# Patient Record
Sex: FEMALE | Race: WHITE | NOT HISPANIC OR LATINO | URBAN - METROPOLITAN AREA
[De-identification: names, ages, dates, MRNs, and addresses within clinical notes are randomized per-mention and may not be internally consistent; named-entity substitution may affect disease eponyms.]

---

## 2017-07-03 ENCOUNTER — OUTPATIENT (OUTPATIENT)
Dept: OUTPATIENT SERVICES | Facility: HOSPITAL | Age: 67
LOS: 1 days | Discharge: HOME | End: 2017-07-03

## 2017-07-03 DIAGNOSIS — Z01.818 ENCOUNTER FOR OTHER PREPROCEDURAL EXAMINATION: ICD-10-CM

## 2017-07-07 ENCOUNTER — OUTPATIENT (OUTPATIENT)
Dept: OUTPATIENT SERVICES | Facility: HOSPITAL | Age: 67
LOS: 1 days | Discharge: HOME | End: 2017-07-07

## 2017-07-14 DIAGNOSIS — M20.42 OTHER HAMMER TOE(S) (ACQUIRED), LEFT FOOT: ICD-10-CM

## 2017-07-14 DIAGNOSIS — M21.611 BUNION OF RIGHT FOOT: ICD-10-CM

## 2017-07-14 DIAGNOSIS — I10 ESSENTIAL (PRIMARY) HYPERTENSION: ICD-10-CM

## 2017-07-14 DIAGNOSIS — M20.11 HALLUX VALGUS (ACQUIRED), RIGHT FOOT: ICD-10-CM

## 2017-07-14 DIAGNOSIS — M20.41 OTHER HAMMER TOE(S) (ACQUIRED), RIGHT FOOT: ICD-10-CM

## 2017-07-14 DIAGNOSIS — E11.9 TYPE 2 DIABETES MELLITUS WITHOUT COMPLICATIONS: ICD-10-CM

## 2017-08-16 ENCOUNTER — OUTPATIENT (OUTPATIENT)
Dept: OUTPATIENT SERVICES | Facility: HOSPITAL | Age: 67
LOS: 1 days | Discharge: HOME | End: 2017-08-16

## 2017-08-16 DIAGNOSIS — Z12.31 ENCOUNTER FOR SCREENING MAMMOGRAM FOR MALIGNANT NEOPLASM OF BREAST: ICD-10-CM

## 2018-02-22 ENCOUNTER — RESULT REVIEW (OUTPATIENT)
Age: 68
End: 2018-02-22

## 2018-02-22 ENCOUNTER — OUTPATIENT (OUTPATIENT)
Dept: OUTPATIENT SERVICES | Facility: HOSPITAL | Age: 68
LOS: 1 days | Discharge: HOME | End: 2018-02-22

## 2018-02-22 VITALS — HEART RATE: 78 BPM | RESPIRATION RATE: 18 BRPM | SYSTOLIC BLOOD PRESSURE: 160 MMHG | DIASTOLIC BLOOD PRESSURE: 100 MMHG

## 2018-02-22 VITALS
DIASTOLIC BLOOD PRESSURE: 85 MMHG | HEIGHT: 61 IN | RESPIRATION RATE: 18 BRPM | HEART RATE: 76 BPM | TEMPERATURE: 98 F | SYSTOLIC BLOOD PRESSURE: 158 MMHG | WEIGHT: 130.07 LBS

## 2018-02-22 DIAGNOSIS — M21.611 BUNION OF RIGHT FOOT: Chronic | ICD-10-CM

## 2018-02-22 NOTE — H&P ADULT - NSHPPHYSICALEXAM_GEN_ALL_CORE
PHYSICAL EXAM:   Vital Signs:  Vital Signs Last 24 Hrs  T(C): 36.7 (22 Feb 2018 08:10), Max: 36.7 (22 Feb 2018 08:10)  T(F): 98.1 (22 Feb 2018 08:10), Max: 98.1 (22 Feb 2018 08:10)  HR: 76 (22 Feb 2018 08:10) (76 - 76)  BP: 158/85 (22 Feb 2018 08:10) (158/85 - 158/85)  BP(mean): --  RR: 18 (22 Feb 2018 08:10) (18 - 18)  SpO2: --  Daily Height in cm: 154.94 (22 Feb 2018 08:10)    Daily     GENERAL:  Appears stated age, well-groomed, well-nourished, no distress  HEENT:  NC/AT,  conjunctivae clear and pink, no thyromegaly, nodules, adenopathy, no JVD, sclera -anicteric  CHEST:  Full & symmetric excursion, no increased effort, breath sounds clear  HEART:  Regular rhythm, S1, S2, no murmur/rub/S3/S4, no abdominal bruit, no edema  ABDOMEN:  Soft, non-tender, non-distended, normoactive bowel sounds,  no masses ,no hepato-splenomegaly, no signs of chronic liver disease  EXTEREMITIES:  no cyanosis,clubbing or edema  SKIN:  No rash/erythema/ecchymoses/petechiae/wounds/abscess/warm/dry  NEURO:  Alert, oriented, no asterixis, no tremor, no encephalopathy

## 2018-02-22 NOTE — H&P ADULT - ASSESSMENT
68 year old female is here for elective outpatient colonoscopy for bright red bleeding per rectum and screening for CRC.

## 2018-02-26 DIAGNOSIS — K64.4 RESIDUAL HEMORRHOIDAL SKIN TAGS: ICD-10-CM

## 2018-02-26 DIAGNOSIS — K64.8 OTHER HEMORRHOIDS: ICD-10-CM

## 2018-02-26 DIAGNOSIS — Z12.11 ENCOUNTER FOR SCREENING FOR MALIGNANT NEOPLASM OF COLON: ICD-10-CM

## 2018-02-26 DIAGNOSIS — K57.30 DIVERTICULOSIS OF LARGE INTESTINE WITHOUT PERFORATION OR ABSCESS WITHOUT BLEEDING: ICD-10-CM

## 2018-02-26 LAB — SURGICAL PATHOLOGY STUDY: SIGNIFICANT CHANGE UP

## 2018-08-17 ENCOUNTER — OUTPATIENT (OUTPATIENT)
Dept: OUTPATIENT SERVICES | Facility: HOSPITAL | Age: 68
LOS: 1 days | Discharge: HOME | End: 2018-08-17

## 2018-08-17 DIAGNOSIS — M21.611 BUNION OF RIGHT FOOT: Chronic | ICD-10-CM

## 2018-08-17 DIAGNOSIS — Z12.31 ENCOUNTER FOR SCREENING MAMMOGRAM FOR MALIGNANT NEOPLASM OF BREAST: ICD-10-CM

## 2018-08-17 PROBLEM — I34.0 NONRHEUMATIC MITRAL (VALVE) INSUFFICIENCY: Chronic | Status: ACTIVE | Noted: 2018-02-22

## 2018-08-17 PROBLEM — I10 ESSENTIAL (PRIMARY) HYPERTENSION: Chronic | Status: ACTIVE | Noted: 2018-02-22

## 2019-08-20 ENCOUNTER — OUTPATIENT (OUTPATIENT)
Dept: OUTPATIENT SERVICES | Facility: HOSPITAL | Age: 69
LOS: 1 days | Discharge: HOME | End: 2019-08-20
Payer: COMMERCIAL

## 2019-08-20 DIAGNOSIS — M21.611 BUNION OF RIGHT FOOT: Chronic | ICD-10-CM

## 2019-08-20 DIAGNOSIS — Z12.31 ENCOUNTER FOR SCREENING MAMMOGRAM FOR MALIGNANT NEOPLASM OF BREAST: ICD-10-CM

## 2019-08-20 PROCEDURE — 77067 SCR MAMMO BI INCL CAD: CPT | Mod: 26

## 2019-08-20 PROCEDURE — 77063 BREAST TOMOSYNTHESIS BI: CPT | Mod: 26

## 2019-10-22 NOTE — ASU PATIENT PROFILE, ADULT - PSH
Bilateral bunions     delivery delivered    History of surgery  tonsillectomy, hysterectomy, b/l eye surgery for muscle, knee miniscus sx right

## 2019-10-23 ENCOUNTER — OUTPATIENT (OUTPATIENT)
Dept: OUTPATIENT SERVICES | Facility: HOSPITAL | Age: 69
LOS: 1 days | Discharge: HOME | End: 2019-10-23

## 2019-10-23 VITALS — SYSTOLIC BLOOD PRESSURE: 145 MMHG | HEART RATE: 67 BPM | DIASTOLIC BLOOD PRESSURE: 70 MMHG | RESPIRATION RATE: 18 BRPM

## 2019-10-23 VITALS
DIASTOLIC BLOOD PRESSURE: 76 MMHG | OXYGEN SATURATION: 99 % | HEART RATE: 69 BPM | SYSTOLIC BLOOD PRESSURE: 158 MMHG | TEMPERATURE: 97 F | RESPIRATION RATE: 18 BRPM | HEIGHT: 61 IN | WEIGHT: 134.92 LBS

## 2019-10-23 DIAGNOSIS — M21.611 BUNION OF RIGHT FOOT: Chronic | ICD-10-CM

## 2019-10-23 DIAGNOSIS — Z98.890 OTHER SPECIFIED POSTPROCEDURAL STATES: Chronic | ICD-10-CM

## 2019-10-23 LAB — GLUCOSE BLDC GLUCOMTR-MCNC: 127 MG/DL — HIGH (ref 70–99)

## 2019-10-23 RX ORDER — RISEDRONATE SODIUM 25.8; 4.2 MG/1; MG/1
0 TABLET, FILM COATED ORAL
Qty: 0 | Refills: 0 | DISCHARGE

## 2019-10-23 NOTE — PRE-ANESTHESIA EVALUATION ADULT - NSANTHOSAYNRD_GEN_A_CORE
No. BRANDI screening performed.  STOP BANG Legend: 0-2 = LOW Risk; 3-4 = INTERMEDIATE Risk; 5-8 = HIGH Risk

## 2019-11-06 DIAGNOSIS — H25.12 AGE-RELATED NUCLEAR CATARACT, LEFT EYE: ICD-10-CM

## 2019-11-06 DIAGNOSIS — I10 ESSENTIAL (PRIMARY) HYPERTENSION: ICD-10-CM

## 2019-11-06 DIAGNOSIS — Z79.84 LONG TERM (CURRENT) USE OF ORAL HYPOGLYCEMIC DRUGS: ICD-10-CM

## 2019-11-06 DIAGNOSIS — E11.9 TYPE 2 DIABETES MELLITUS WITHOUT COMPLICATIONS: ICD-10-CM

## 2019-11-06 DIAGNOSIS — I34.0 NONRHEUMATIC MITRAL (VALVE) INSUFFICIENCY: ICD-10-CM

## 2020-02-20 ENCOUNTER — OUTPATIENT (OUTPATIENT)
Dept: OUTPATIENT SERVICES | Facility: HOSPITAL | Age: 70
LOS: 1 days | Discharge: HOME | End: 2020-02-20

## 2020-02-20 DIAGNOSIS — M21.611 BUNION OF RIGHT FOOT: Chronic | ICD-10-CM

## 2020-02-20 DIAGNOSIS — Z98.890 OTHER SPECIFIED POSTPROCEDURAL STATES: Chronic | ICD-10-CM

## 2020-02-20 PROBLEM — Z84.89 FAMILY HISTORY OF OTHER SPECIFIED CONDITIONS: Chronic | Status: ACTIVE | Noted: 2019-10-23

## 2020-02-25 DIAGNOSIS — Z09 ENCOUNTER FOR FOLLOW-UP EXAMINATION AFTER COMPLETED TREATMENT FOR CONDITIONS OTHER THAN MALIGNANT NEOPLASM: ICD-10-CM

## 2020-02-25 DIAGNOSIS — Z78.0 ASYMPTOMATIC MENOPAUSAL STATE: ICD-10-CM

## 2020-02-25 DIAGNOSIS — M89.9 DISORDER OF BONE, UNSPECIFIED: ICD-10-CM

## 2020-08-31 ENCOUNTER — OUTPATIENT (OUTPATIENT)
Dept: OUTPATIENT SERVICES | Facility: HOSPITAL | Age: 70
LOS: 1 days | Discharge: HOME | End: 2020-08-31
Payer: COMMERCIAL

## 2020-08-31 DIAGNOSIS — Z98.890 OTHER SPECIFIED POSTPROCEDURAL STATES: Chronic | ICD-10-CM

## 2020-08-31 DIAGNOSIS — M21.611 BUNION OF RIGHT FOOT: Chronic | ICD-10-CM

## 2020-08-31 DIAGNOSIS — Z12.31 ENCOUNTER FOR SCREENING MAMMOGRAM FOR MALIGNANT NEOPLASM OF BREAST: ICD-10-CM

## 2020-08-31 PROCEDURE — 77063 BREAST TOMOSYNTHESIS BI: CPT | Mod: 26

## 2020-08-31 PROCEDURE — 77067 SCR MAMMO BI INCL CAD: CPT | Mod: 26

## 2021-09-04 ENCOUNTER — OUTPATIENT (OUTPATIENT)
Dept: OUTPATIENT SERVICES | Facility: HOSPITAL | Age: 71
LOS: 1 days | Discharge: HOME | End: 2021-09-04
Payer: COMMERCIAL

## 2021-09-04 DIAGNOSIS — Z12.31 ENCOUNTER FOR SCREENING MAMMOGRAM FOR MALIGNANT NEOPLASM OF BREAST: ICD-10-CM

## 2021-09-04 DIAGNOSIS — M21.611 BUNION OF RIGHT FOOT: Chronic | ICD-10-CM

## 2021-09-04 DIAGNOSIS — Z98.890 OTHER SPECIFIED POSTPROCEDURAL STATES: Chronic | ICD-10-CM

## 2021-09-04 PROCEDURE — 77063 BREAST TOMOSYNTHESIS BI: CPT | Mod: 26

## 2021-09-04 PROCEDURE — 77067 SCR MAMMO BI INCL CAD: CPT | Mod: 26

## 2022-04-29 ENCOUNTER — INPATIENT (INPATIENT)
Facility: HOSPITAL | Age: 72
LOS: 4 days | Discharge: HOME | End: 2022-05-04
Attending: INTERNAL MEDICINE | Admitting: INTERNAL MEDICINE
Payer: COMMERCIAL

## 2022-04-29 VITALS
TEMPERATURE: 98 F | HEIGHT: 61 IN | OXYGEN SATURATION: 97 % | RESPIRATION RATE: 18 BRPM | SYSTOLIC BLOOD PRESSURE: 198 MMHG | DIASTOLIC BLOOD PRESSURE: 83 MMHG | HEART RATE: 45 BPM

## 2022-04-29 DIAGNOSIS — M21.611 BUNION OF RIGHT FOOT: Chronic | ICD-10-CM

## 2022-04-29 DIAGNOSIS — Z98.890 OTHER SPECIFIED POSTPROCEDURAL STATES: Chronic | ICD-10-CM

## 2022-04-29 LAB
ALBUMIN SERPL ELPH-MCNC: 4.6 G/DL — SIGNIFICANT CHANGE UP (ref 3.5–5.2)
ALP SERPL-CCNC: 53 U/L — SIGNIFICANT CHANGE UP (ref 30–115)
ALT FLD-CCNC: 39 U/L — SIGNIFICANT CHANGE UP (ref 0–41)
ANION GAP SERPL CALC-SCNC: 15 MMOL/L — HIGH (ref 7–14)
AST SERPL-CCNC: 27 U/L — SIGNIFICANT CHANGE UP (ref 0–41)
BASOPHILS # BLD AUTO: 0.07 K/UL — SIGNIFICANT CHANGE UP (ref 0–0.2)
BASOPHILS NFR BLD AUTO: 0.9 % — SIGNIFICANT CHANGE UP (ref 0–1)
BILIRUB SERPL-MCNC: 0.7 MG/DL — SIGNIFICANT CHANGE UP (ref 0.2–1.2)
BLD GP AB SCN SERPL QL: SIGNIFICANT CHANGE UP
BUN SERPL-MCNC: 8 MG/DL — LOW (ref 10–20)
CALCIUM SERPL-MCNC: 9.5 MG/DL — SIGNIFICANT CHANGE UP (ref 8.5–10.1)
CHLORIDE SERPL-SCNC: 108 MMOL/L — SIGNIFICANT CHANGE UP (ref 98–110)
CO2 SERPL-SCNC: 22 MMOL/L — SIGNIFICANT CHANGE UP (ref 17–32)
CREAT SERPL-MCNC: 0.6 MG/DL — LOW (ref 0.7–1.5)
EGFR: 96 ML/MIN/1.73M2 — SIGNIFICANT CHANGE UP
EOSINOPHIL # BLD AUTO: 0.12 K/UL — SIGNIFICANT CHANGE UP (ref 0–0.7)
EOSINOPHIL NFR BLD AUTO: 1.5 % — SIGNIFICANT CHANGE UP (ref 0–8)
GLUCOSE SERPL-MCNC: 148 MG/DL — HIGH (ref 70–99)
HCT VFR BLD CALC: 39.4 % — SIGNIFICANT CHANGE UP (ref 37–47)
HGB BLD-MCNC: 13.4 G/DL — SIGNIFICANT CHANGE UP (ref 12–16)
IMM GRANULOCYTES NFR BLD AUTO: 0.2 % — SIGNIFICANT CHANGE UP (ref 0.1–0.3)
LYMPHOCYTES # BLD AUTO: 1.46 K/UL — SIGNIFICANT CHANGE UP (ref 1.2–3.4)
LYMPHOCYTES # BLD AUTO: 18 % — LOW (ref 20.5–51.1)
MAGNESIUM SERPL-MCNC: 1.6 MG/DL — LOW (ref 1.8–2.4)
MCHC RBC-ENTMCNC: 29.5 PG — SIGNIFICANT CHANGE UP (ref 27–31)
MCHC RBC-ENTMCNC: 34 G/DL — SIGNIFICANT CHANGE UP (ref 32–37)
MCV RBC AUTO: 86.6 FL — SIGNIFICANT CHANGE UP (ref 81–99)
MONOCYTES # BLD AUTO: 0.68 K/UL — HIGH (ref 0.1–0.6)
MONOCYTES NFR BLD AUTO: 8.4 % — SIGNIFICANT CHANGE UP (ref 1.7–9.3)
NEUTROPHILS # BLD AUTO: 5.77 K/UL — SIGNIFICANT CHANGE UP (ref 1.4–6.5)
NEUTROPHILS NFR BLD AUTO: 71 % — SIGNIFICANT CHANGE UP (ref 42.2–75.2)
NRBC # BLD: 0 /100 WBCS — SIGNIFICANT CHANGE UP (ref 0–0)
NT-PROBNP SERPL-SCNC: 1132 PG/ML — HIGH (ref 0–300)
PLATELET # BLD AUTO: 275 K/UL — SIGNIFICANT CHANGE UP (ref 130–400)
POTASSIUM SERPL-MCNC: 3.9 MMOL/L — SIGNIFICANT CHANGE UP (ref 3.5–5)
POTASSIUM SERPL-SCNC: 3.9 MMOL/L — SIGNIFICANT CHANGE UP (ref 3.5–5)
PROT SERPL-MCNC: 7.2 G/DL — SIGNIFICANT CHANGE UP (ref 6–8)
RBC # BLD: 4.55 M/UL — SIGNIFICANT CHANGE UP (ref 4.2–5.4)
RBC # FLD: 14.6 % — HIGH (ref 11.5–14.5)
SARS-COV-2 RNA SPEC QL NAA+PROBE: SIGNIFICANT CHANGE UP
SODIUM SERPL-SCNC: 145 MMOL/L — SIGNIFICANT CHANGE UP (ref 135–146)
TROPONIN T SERPL-MCNC: <0.01 NG/ML — SIGNIFICANT CHANGE UP
WBC # BLD: 8.12 K/UL — SIGNIFICANT CHANGE UP (ref 4.8–10.8)
WBC # FLD AUTO: 8.12 K/UL — SIGNIFICANT CHANGE UP (ref 4.8–10.8)

## 2022-04-29 PROCEDURE — 71045 X-RAY EXAM CHEST 1 VIEW: CPT | Mod: 26

## 2022-04-29 PROCEDURE — 93010 ELECTROCARDIOGRAM REPORT: CPT

## 2022-04-29 PROCEDURE — 99285 EMERGENCY DEPT VISIT HI MDM: CPT

## 2022-04-29 RX ORDER — MAGNESIUM SULFATE 500 MG/ML
2 VIAL (ML) INJECTION ONCE
Refills: 0 | Status: COMPLETED | OUTPATIENT
Start: 2022-04-29 | End: 2022-04-29

## 2022-04-29 RX ORDER — NEBIVOLOL HYDROCHLORIDE 5 MG/1
1 TABLET ORAL
Qty: 0 | Refills: 0 | DISCHARGE

## 2022-04-29 RX ADMIN — Medication 25 GRAM(S): at 22:00

## 2022-04-29 NOTE — H&P ADULT - HISTORY OF PRESENT ILLNESS
Patient is a 72 year old female with PMHx opf LBBB, HTN, Diabetes Mellitus Type 2, and mitral valve prolapse presents to the ED following an episode of bradycardia and chest discomfort. Patient was planned for colonoscopy today when her HS was found to be 35 - 45 bpm. Patient immediately  called her cardiologist Dr. Chino who recommenced patient report to the ED for evaluation. At patients last outpatient cardiology visit in Feb 2022, ekg demonstrated baseline LBBB. Patient denies headache, blurred vision, syncope, dizziness, chest pain, shortness of breath, palpitations, diaphoresis, abdominal pain, or nausea/vomitting/diarrhea.      In the ED, patient initially hypertensive to 198/92 with HR of 45. Hypertension self resolved, now 133/55 with HR of 55. Labs revealing Mg 1.6 and BNP 1132. Troponin negative x1. EKG demonstrating complete heart block. Patient is afebrile and saturating well on room air. Patient currently asymptomatic. She was evaluated by the cardiology fellow, recommending admission to CCU for monitoring.

## 2022-04-29 NOTE — H&P ADULT - ASSESSMENT
IMPRESSION:  Complete Heart Block  Hx of LBBB  Diabetes Mellitus Type 2  HTN    PLAN:    CNS: Avoid CNS Depressants     HEENT: Oral care. Aspiration precautions     PULMONARY: HOB @ 45. Monitor Pulse Ox. Keep > 93%. Supplement as needed.    CARDIOVASCULAR: currently HD stable and asymptomatic, admit to CCU for monitoring, follow up EP recommendations Keep I < O    GI: NPO for now in case of possible procedure     RENAL: Avoid nephrotoxic agents, Cr at baseline     INFECTIOUS DISEASE: no evidence of any underlying infectious process     HEMATOLOGICAL: heparin for DVT PPX     ENDOCRINE: Monitor FSG, Check HbA1c, Lipid panel, TSH    MUSCULOSKELETAL: Ambulate as tolerated     CODE STATUS: Full Code     DISPOSITION: admit to CCU

## 2022-04-29 NOTE — ED PROVIDER NOTE - ATTENDING APP SHARED VISIT CONTRIBUTION OF CARE
Patient is a 72-year-old female comes in with several weeks of feeling tired having chest pressure and shortness of breath on exertion.  Symptoms resolved with rest.  She went for colonoscopy today and was found to be bradycardic down to 30s by anesthesiologist.  Colonoscopy was uneventful and patient came to ED afterwards.  Lying in bed patient has no symptoms other than feeling a little tired.  She had attributed her symptoms to possible anemia after noticing blood in her stool for the last several weeks but has not had any blood testing.    Exam: Pink conjunctivae, cap refill less than 2 seconds, bradycardia, lungs clear to auscultation, no lower extremity edema, no JVD, no calf tenderness, soft nontender abdomen, no acute distress, normal gait  Plan: Labs, EKG, chest x-ray, cardiology consult for permanent pacemaker placement

## 2022-04-29 NOTE — ED PROVIDER NOTE - NS ED ROS FT
Review of Systems  Constitutional:  No fever, chills, malaise, generalized weakness.  Eyes:  No visual changes, eye pain, or discharge.  ENMT:  No hearing changes, pain, or discharge. No nasal congestion, discharge, or bleeding. No throat pain, swelling, or difficulty swallowing.  Cardiac:  No chest pain, palpitations, syncope, or edema.  Respiratory:  + dyspnea, - cough.  GI:  No nausea, vomiting, diarrhea, or abdominal pain. No melena or hematochezia.  :  No dysuria  MS:  No myalgia, muscle weakness, gait abnormality, joint swelling, joint pain, or back pain.  Skin:  No skin rash, pruritis, jaundice, or lesions.  Neuro:  No headache, dizziness, loss of sensation, or focal weakness.

## 2022-04-29 NOTE — ED PROVIDER NOTE - OBJECTIVE STATEMENT
72 F pmhx of LBBB, HTN, DMII, MVP presents to the ED one episode of bradycardia and chest discomfort. pt was at colonscopy today when he HR went down to 45 bpm and then dropped to 35 after. pt called her cardiologist Dr Alexis, who she last saw in feb for routine work up. at time pt had EKG and lab work which were within her norm. pt describes the chest discomfort at a sensation in the center of her chest that comes and goes which is made worse with exertion. denies HA, fever, chills, N,V, blurry vision

## 2022-04-29 NOTE — ED PROVIDER NOTE - PHYSICAL EXAMINATION
VITAL SIGNS: I have reviewed nursing notes and confirm.  CONSTITUTIONAL: Well-developed; well-nourished; in no acute distress.  SKIN: Skin exam is warm and dry, no acute rash.  EYES: EOM intact; conjunctiva and sclera clear.  ENT: No nasal discharge; airway clear.  NECK: Supple; non tender.  CARD: S1, S2 normal; no murmurs, gallops, or rubs.  javier  rate and rhythm.  RESP: No wheezes, rales or rhonchi. Speaking in full sentences.   ABD: Normal bowel sounds; soft; non-distended; non-tender; No rebound or guarding. No CVA tenderness.  EXT: Normal ROM. No clubbing, cyanosis or edema.  NEURO: Alert, oriented. Grossly unremarkable. No focal deficits.

## 2022-04-29 NOTE — ED ADULT NURSE REASSESSMENT NOTE - NS ED NURSE REASSESS COMMENT FT1
Patient met in room at start of shift, VSS. Patient comfortable and resting. Remains asymptomatic. Will continue to monitor.

## 2022-04-29 NOTE — ED PROVIDER NOTE - CLINICAL SUMMARY MEDICAL DECISION MAKING FREE TEXT BOX
complete heart block - symptomatic bradycardia - hemodynamically stable at this time - will schedule PPM - admitted to CCU

## 2022-04-29 NOTE — ED ADULT NURSE NOTE - NSIMPLEMENTINTERV_GEN_ALL_ED
Implemented All Universal Safety Interventions:  Anabel to call system. Call bell, personal items and telephone within reach. Instruct patient to call for assistance. Room bathroom lighting operational. Non-slip footwear when patient is off stretcher. Physically safe environment: no spills, clutter or unnecessary equipment. Stretcher in lowest position, wheels locked, appropriate side rails in place.

## 2022-04-29 NOTE — ED PROVIDER NOTE - NS ED ATTENDING STATEMENT MOD
I have personally provided the amount of critical care time documented below concurrently with the resident/fellow.  This time excludes time spent on separate procedures and time spent teaching. I have reviewed the resident’s / fellow’s documentation and I agree with the history, exam, and assessment and plan of care. Attending with This was a shared visit with the JAIME. I reviewed and verified the documentation and independently performed the documented:

## 2022-04-30 LAB
ALBUMIN SERPL ELPH-MCNC: 4.2 G/DL — SIGNIFICANT CHANGE UP (ref 3.5–5.2)
ALP SERPL-CCNC: 51 U/L — SIGNIFICANT CHANGE UP (ref 30–115)
ALT FLD-CCNC: 33 U/L — SIGNIFICANT CHANGE UP (ref 0–41)
ANION GAP SERPL CALC-SCNC: 14 MMOL/L — SIGNIFICANT CHANGE UP (ref 7–14)
AST SERPL-CCNC: 19 U/L — SIGNIFICANT CHANGE UP (ref 0–41)
BASOPHILS # BLD AUTO: 0.05 K/UL — SIGNIFICANT CHANGE UP (ref 0–0.2)
BASOPHILS NFR BLD AUTO: 0.7 % — SIGNIFICANT CHANGE UP (ref 0–1)
BILIRUB SERPL-MCNC: 0.7 MG/DL — SIGNIFICANT CHANGE UP (ref 0.2–1.2)
BUN SERPL-MCNC: 12 MG/DL — SIGNIFICANT CHANGE UP (ref 10–20)
CALCIUM SERPL-MCNC: 9 MG/DL — SIGNIFICANT CHANGE UP (ref 8.5–10.1)
CHLORIDE SERPL-SCNC: 108 MMOL/L — SIGNIFICANT CHANGE UP (ref 98–110)
CO2 SERPL-SCNC: 23 MMOL/L — SIGNIFICANT CHANGE UP (ref 17–32)
CREAT SERPL-MCNC: 0.6 MG/DL — LOW (ref 0.7–1.5)
EGFR: 96 ML/MIN/1.73M2 — SIGNIFICANT CHANGE UP
EOSINOPHIL # BLD AUTO: 0.15 K/UL — SIGNIFICANT CHANGE UP (ref 0–0.7)
EOSINOPHIL NFR BLD AUTO: 2 % — SIGNIFICANT CHANGE UP (ref 0–8)
GLUCOSE BLDC GLUCOMTR-MCNC: 122 MG/DL — HIGH (ref 70–99)
GLUCOSE BLDC GLUCOMTR-MCNC: 142 MG/DL — HIGH (ref 70–99)
GLUCOSE BLDC GLUCOMTR-MCNC: 151 MG/DL — HIGH (ref 70–99)
GLUCOSE BLDC GLUCOMTR-MCNC: 155 MG/DL — HIGH (ref 70–99)
GLUCOSE BLDC GLUCOMTR-MCNC: 158 MG/DL — HIGH (ref 70–99)
GLUCOSE SERPL-MCNC: 132 MG/DL — HIGH (ref 70–99)
HCT VFR BLD CALC: 37.3 % — SIGNIFICANT CHANGE UP (ref 37–47)
HCV AB S/CO SERPL IA: 0.04 COI — SIGNIFICANT CHANGE UP
HCV AB SERPL-IMP: SIGNIFICANT CHANGE UP
HGB BLD-MCNC: 12.7 G/DL — SIGNIFICANT CHANGE UP (ref 12–16)
IMM GRANULOCYTES NFR BLD AUTO: 0.3 % — SIGNIFICANT CHANGE UP (ref 0.1–0.3)
LYMPHOCYTES # BLD AUTO: 1.14 K/UL — LOW (ref 1.2–3.4)
LYMPHOCYTES # BLD AUTO: 15.1 % — LOW (ref 20.5–51.1)
MAGNESIUM SERPL-MCNC: 2.6 MG/DL — HIGH (ref 1.8–2.4)
MCHC RBC-ENTMCNC: 29.5 PG — SIGNIFICANT CHANGE UP (ref 27–31)
MCHC RBC-ENTMCNC: 34 G/DL — SIGNIFICANT CHANGE UP (ref 32–37)
MCV RBC AUTO: 86.7 FL — SIGNIFICANT CHANGE UP (ref 81–99)
MONOCYTES # BLD AUTO: 0.71 K/UL — HIGH (ref 0.1–0.6)
MONOCYTES NFR BLD AUTO: 9.4 % — HIGH (ref 1.7–9.3)
NEUTROPHILS # BLD AUTO: 5.5 K/UL — SIGNIFICANT CHANGE UP (ref 1.4–6.5)
NEUTROPHILS NFR BLD AUTO: 72.5 % — SIGNIFICANT CHANGE UP (ref 42.2–75.2)
NRBC # BLD: 0 /100 WBCS — SIGNIFICANT CHANGE UP (ref 0–0)
PLATELET # BLD AUTO: 246 K/UL — SIGNIFICANT CHANGE UP (ref 130–400)
POTASSIUM SERPL-MCNC: 3.8 MMOL/L — SIGNIFICANT CHANGE UP (ref 3.5–5)
POTASSIUM SERPL-SCNC: 3.8 MMOL/L — SIGNIFICANT CHANGE UP (ref 3.5–5)
PROT SERPL-MCNC: 6.1 G/DL — SIGNIFICANT CHANGE UP (ref 6–8)
RBC # BLD: 4.3 M/UL — SIGNIFICANT CHANGE UP (ref 4.2–5.4)
RBC # FLD: 14.6 % — HIGH (ref 11.5–14.5)
SODIUM SERPL-SCNC: 145 MMOL/L — SIGNIFICANT CHANGE UP (ref 135–146)
T4 AB SER-ACNC: 7.6 UG/DL — SIGNIFICANT CHANGE UP (ref 4.6–12)
WBC # BLD: 7.57 K/UL — SIGNIFICANT CHANGE UP (ref 4.8–10.8)
WBC # FLD AUTO: 7.57 K/UL — SIGNIFICANT CHANGE UP (ref 4.8–10.8)

## 2022-04-30 PROCEDURE — 93306 TTE W/DOPPLER COMPLETE: CPT | Mod: 26

## 2022-04-30 PROCEDURE — 93010 ELECTROCARDIOGRAM REPORT: CPT

## 2022-04-30 PROCEDURE — 99223 1ST HOSP IP/OBS HIGH 75: CPT | Mod: 25

## 2022-04-30 PROCEDURE — 99291 CRITICAL CARE FIRST HOUR: CPT

## 2022-04-30 RX ORDER — INSULIN LISPRO 100/ML
VIAL (ML) SUBCUTANEOUS
Refills: 0 | Status: DISCONTINUED | OUTPATIENT
Start: 2022-04-30 | End: 2022-05-04

## 2022-04-30 RX ORDER — AMLODIPINE BESYLATE 2.5 MG/1
5 TABLET ORAL DAILY
Refills: 0 | Status: DISCONTINUED | OUTPATIENT
Start: 2022-04-30 | End: 2022-05-04

## 2022-04-30 RX ORDER — CHLORHEXIDINE GLUCONATE 213 G/1000ML
1 SOLUTION TOPICAL
Refills: 0 | Status: DISCONTINUED | OUTPATIENT
Start: 2022-04-30 | End: 2022-05-04

## 2022-04-30 RX ORDER — ATORVASTATIN CALCIUM 80 MG/1
40 TABLET, FILM COATED ORAL AT BEDTIME
Refills: 0 | Status: DISCONTINUED | OUTPATIENT
Start: 2022-04-30 | End: 2022-05-04

## 2022-04-30 RX ORDER — ACETAMINOPHEN 500 MG
650 TABLET ORAL EVERY 6 HOURS
Refills: 0 | Status: DISCONTINUED | OUTPATIENT
Start: 2022-04-30 | End: 2022-05-04

## 2022-04-30 RX ORDER — MAGNESIUM SULFATE 500 MG/ML
2 VIAL (ML) INJECTION ONCE
Refills: 0 | Status: COMPLETED | OUTPATIENT
Start: 2022-04-30 | End: 2022-04-30

## 2022-04-30 RX ORDER — ENOXAPARIN SODIUM 100 MG/ML
40 INJECTION SUBCUTANEOUS EVERY 24 HOURS
Refills: 0 | Status: DISCONTINUED | OUTPATIENT
Start: 2022-04-30 | End: 2022-04-30

## 2022-04-30 RX ORDER — MESALAMINE 400 MG
1200 TABLET, DELAYED RELEASE (ENTERIC COATED) ORAL EVERY 8 HOURS
Refills: 0 | Status: DISCONTINUED | OUTPATIENT
Start: 2022-04-30 | End: 2022-05-04

## 2022-04-30 RX ORDER — INSULIN LISPRO 100/ML
VIAL (ML) SUBCUTANEOUS AT BEDTIME
Refills: 0 | Status: DISCONTINUED | OUTPATIENT
Start: 2022-04-30 | End: 2022-05-04

## 2022-04-30 RX ORDER — POTASSIUM CHLORIDE 20 MEQ
40 PACKET (EA) ORAL ONCE
Refills: 0 | Status: COMPLETED | OUTPATIENT
Start: 2022-04-30 | End: 2022-04-30

## 2022-04-30 RX ADMIN — Medication 20 MILLIGRAM(S): at 06:32

## 2022-04-30 RX ADMIN — Medication 1200 MILLIGRAM(S): at 17:38

## 2022-04-30 RX ADMIN — CHLORHEXIDINE GLUCONATE 1 APPLICATION(S): 213 SOLUTION TOPICAL at 06:32

## 2022-04-30 RX ADMIN — Medication 1: at 17:38

## 2022-04-30 RX ADMIN — Medication 40 MILLIEQUIVALENT(S): at 15:52

## 2022-04-30 RX ADMIN — Medication 650 MILLIGRAM(S): at 21:15

## 2022-04-30 RX ADMIN — AMLODIPINE BESYLATE 5 MILLIGRAM(S): 2.5 TABLET ORAL at 11:12

## 2022-04-30 RX ADMIN — Medication 20 MILLIGRAM(S): at 17:30

## 2022-04-30 RX ADMIN — ATORVASTATIN CALCIUM 40 MILLIGRAM(S): 80 TABLET, FILM COATED ORAL at 21:08

## 2022-04-30 RX ADMIN — Medication 25 GRAM(S): at 09:14

## 2022-04-30 RX ADMIN — Medication 1: at 11:16

## 2022-04-30 RX ADMIN — Medication 650 MILLIGRAM(S): at 22:45

## 2022-04-30 NOTE — CONSULT NOTE ADULT - SUBJECTIVE AND OBJECTIVE BOX
HPI:  Patient is a 72 year old female with PMHx opf LBBB, HTN, Diabetes Mellitus Type 2, and mitral valve prolapse presents to the ED following an episode of bradycardia and chest discomfort. Patient was planned for colonoscopy today when her HS was found to be 35 - 45 bpm. Patient immediately  called her cardiologist Dr. Chino who recommenced patient report to the ED for evaluation. At patients last outpatient cardiology visit in 2022, ekg demonstrated baseline LBBB. Patient denies headache, blurred vision, syncope, dizziness, chest pain, shortness of breath, palpitations, diaphoresis, abdominal pain, or nausea/vomitting/diarrhea.      In the ED, patient initially hypertensive to 198/92 with HR of 45. Hypertension self resolved, now 133/55 with HR of 55. Labs revealing Mg 1.6 and BNP 1132. Troponin negative x1. EKG demonstrating complete heart block. Patient is afebrile and saturating well on room air. Patient currently asymptomatic. She was evaluated by the cardiology fellow, recommending admission to CCU for monitoring.     PAST MEDICAL & SURGICAL HISTORY  Hypertension    Type 1 diabetes mellitus without complication    Mitral insufficiency    Family history of medical problems  cad, lbbb, hyperchoesteremia    Bilateral bunions     delivery delivered    History of surgery  tonsillectomy, hysterectomy, b/l eye surgery for muscle, knee miniscus sx right        FAMILY HISTORY:  FAMILY HISTORY:  Sister pacemaker    SOCIAL HISTORY:  []smoker denies  []Alcohol denies  []Drug denies    ALLERGIES:  No Known Allergies      MEDICATIONS:  MEDICATIONS  (STANDING):  amLODIPine   Tablet 5 milliGRAM(s) Oral daily  atorvastatin 40 milliGRAM(s) Oral at bedtime  chlorhexidine 4% Liquid 1 Application(s) Topical <User Schedule>  enalapril 20 milliGRAM(s) Oral two times a day  enoxaparin Injectable 40 milliGRAM(s) SubCutaneous every 24 hours  insulin lispro (ADMELOG) corrective regimen sliding scale   SubCutaneous three times a day before meals  insulin lispro (ADMELOG) corrective regimen sliding scale   SubCutaneous at bedtime    MEDICATIONS  (PRN):      HOME MEDICATIONS:  Home Medications:  amLODIPine 5 mg oral tablet: 1 tab(s) orally once a day (2022 22:33)  Crestor 10 mg oral tablet: orally once a day (23 Oct 2019 10:11)  enalapril 20 mg oral tablet: orally 2 times a day (23 Oct 2019 10:11)  metFORMIN 500 mg oral tablet, extended release: orally 2 times a day (23 Oct 2019 10:11)      VITALS:   T(F): 97.5 ( @ 04:00), Max: 98.3 ( @ 18:13)  HR: 38 ( @ 06:00) (36 - 55)  BP: 132/63 ( @ 06:00) (111/59 - 198/83)  BP(mean): 90 ( @ 06:00) (81 - 125)  RR: 33 ( @ 06:00) (18 - 34)  SpO2: 95% ( @ 06:00) (92% - 98%)    I&O's Summary      REVIEW OF SYSTEMS:  CONSTITUTIONAL: Weakness  EYES: No visual changes  ENT: No vertigo or throat pain   NECK: No pain or stiffness  RESPIRATORY: No cough, wheezing, hemoptysis; No shortness of breath  CARDIOVASCULAR: No chest pain or palpitations  GASTROINTESTINAL: No abdominal or epigastric pain. No nausea, vomiting, or hematemesis; No diarrhea or constipation. No melena or hematochezia.  GENITOURINARY: No dysuria, frequency or hematuria  NEUROLOGICAL: Dizziness  SKIN: No itching, no rashes  MSK: no    PHYSICAL EXAM:  NEURO: patient is awake , alert and oriented  GEN: Not in acute distress  NECK: no thyroid enlargement, no JVD  LUNGS: Clear to auscultation bilaterally   CARDIOVASCULAR: S1/S2 present, regular rhythm bradycardic , no murmurs or rubs, no carotid bruits,  + PP bilaterally  ABD: Soft, non-tender, non-distended, +BS  EXT: No REN  SKIN: Intact    LABS:                        13.4   8.12  )-----------( 275      ( 2022 18:54 )             39.4         145  |  108  |  8<L>  ----------------------------<  148<H>  3.9   |  22  |  0.6<L>    Ca    9.5      2022 18:54  Mg     1.6         TPro  7.2  /  Alb  4.6  /  TBili  0.7  /  DBili  x   /  AST  27  /  ALT  39  /  AlkPhos  53        Troponin T, Serum: <0.01 ng/mL (22 @ 18:54)    CARDIAC MARKERS ( 2022 18:54 )  x     / <0.01 ng/mL / x     / x     / x            Troponin trend:    Serum Pro-Brain Natriuretic Peptide: 1132 pg/mL (22 @ 18:54)          RADIOLOGY:  -CXR:  -TTE:  -CCTA:  -STRESS TEST:  -CATHETERIZATION:    ECG:  Sinus rhythm with complete heart block and junctional escape rhythm    TELEMETRY EVENTS:   HPI:  Patient is a 72 year old female with PMHx opf LBBB, HTN, Diabetes Mellitus Type 2, and mitral valve prolapse presents to the ED following an episode of bradycardia and chest discomfort. Patient was planned for colonoscopy today when her HS was found to be 35 - 45 bpm. Patient immediately  called her cardiologist Dr. Chino who recommenced patient report to the ED for evaluation. At patients last outpatient cardiology visit in 2022, ekg demonstrated baseline LBBB. Patient denies headache, blurred vision, syncope, dizziness, chest pain, shortness of breath, palpitations, diaphoresis, abdominal pain, or nausea/vomitting/diarrhea.      In the ED, patient initially hypertensive to 198/92 with HR of 45. Hypertension self resolved, now 133/55 with HR of 55. Labs revealing Mg 1.6 and BNP 1132. Troponin negative x1. EKG demonstrating complete heart block. Patient is afebrile and saturating well on room air. Patient currently asymptomatic. She was evaluated by the cardiology fellow, recommending admission to CCU for monitoring.     PAST MEDICAL & SURGICAL HISTORY  Hypertension    Type 1 diabetes mellitus without complication    Mitral insufficiency    Family history of medical problems  cad, lbbb, hyperchoesteremia    Bilateral bunions     delivery delivered    History of surgery  tonsillectomy, hysterectomy, b/l eye surgery for muscle, knee miniscus sx right        FAMILY HISTORY:  FAMILY HISTORY:  Sister pacemaker    SOCIAL HISTORY:  []smoker denies  []Alcohol denies  []Drug denies    ALLERGIES:  No Known Allergies      MEDICATIONS:  MEDICATIONS  (STANDING):  amLODIPine   Tablet 5 milliGRAM(s) Oral daily  atorvastatin 40 milliGRAM(s) Oral at bedtime  chlorhexidine 4% Liquid 1 Application(s) Topical <User Schedule>  enalapril 20 milliGRAM(s) Oral two times a day  enoxaparin Injectable 40 milliGRAM(s) SubCutaneous every 24 hours  insulin lispro (ADMELOG) corrective regimen sliding scale   SubCutaneous three times a day before meals  insulin lispro (ADMELOG) corrective regimen sliding scale   SubCutaneous at bedtime    MEDICATIONS  (PRN):      HOME MEDICATIONS:  Home Medications:  amLODIPine 5 mg oral tablet: 1 tab(s) orally once a day (2022 22:33)  Crestor 10 mg oral tablet: orally once a day (23 Oct 2019 10:11)  enalapril 20 mg oral tablet: orally 2 times a day (23 Oct 2019 10:11)  metFORMIN 500 mg oral tablet, extended release: orally 2 times a day (23 Oct 2019 10:11)      VITALS:   T(F): 97.5 ( @ 04:00), Max: 98.3 ( @ 18:13)  HR: 38 ( @ 06:00) (36 - 55)  BP: 132/63 ( @ 06:00) (111/59 - 198/83)  BP(mean): 90 ( @ 06:00) (81 - 125)  RR: 33 ( @ 06:00) (18 - 34)  SpO2: 95% ( @ 06:00) (92% - 98%)    I&O's Summary      REVIEW OF SYSTEMS:  CONSTITUTIONAL: Weakness  EYES: No visual changes  ENT: No vertigo or throat pain   NECK: No pain or stiffness  RESPIRATORY: No cough, wheezing, hemoptysis; No shortness of breath  CARDIOVASCULAR: No chest pain or palpitations  GASTROINTESTINAL: No abdominal or epigastric pain. No nausea, vomiting, or hematemesis; No diarrhea or constipation. No melena or hematochezia.  GENITOURINARY: No dysuria, frequency or hematuria  NEUROLOGICAL: Dizziness  SKIN: No itching, no rashes  MSK: no    PHYSICAL EXAM:  NEURO: patient is awake , alert and oriented  GEN: Not in acute distress  NECK: no no JVD  LUNGS: Clear to auscultation bilaterally   CARDIOVASCULAR: S1/S2 present, regular rhythm, bradycardic , no murmurs or rubs, no carotid bruits,  + PP bilaterally  ABD: Soft, non-tender, non-distended, +BS  EXT: No REN  SKIN: Intact    LABS:                        13.4   8.12  )-----------( 275      ( 2022 18:54 )             39.4         145  |  108  |  8<L>  ----------------------------<  148<H>  3.9   |  22  |  0.6<L>    Ca    9.5      2022 18:54  Mg     1.6         TPro  7.2  /  Alb  4.6  /  TBili  0.7  /  DBili  x   /  AST  27  /  ALT  39  /  AlkPhos  53        Troponin T, Serum: <0.01 ng/mL (22 @ 18:54)    CARDIAC MARKERS ( 2022 18:54 )  x     / <0.01 ng/mL / x     / x     / x            Troponin trend:    Serum Pro-Brain Natriuretic Peptide: 1132 pg/mL (22 @ 18:54)          RADIOLOGY:  -CXR:  -TTE: at Dr. Chino's office  -CCTA:  -STRESS TEST: several years ago  -CATHETERIZATION: Never    ECG:  Sinus rhythm with complete heart block and junctional escape rhythm    TELEMETRY EVENTS:

## 2022-04-30 NOTE — PROGRESS NOTE ADULT - SUBJECTIVE AND OBJECTIVE BOX
PATIENT:  GALILEA BROOKE  [id number]    CHIEF COMPLAINT:  Patient is a 72y old  Female who presents with a chief complaint of INCOMPLETE NOTE (29 Apr 2022 20:44)      INTERVAL HISTORY/OVERNIGHT EVENTS:      REVIEW OF SYSTEMS:    Constitutional:     [ ] negative [ ] fevers [ ] chills [ ] weight loss [ ] weight gain  HEENT:                  [ ] negative [ ] dry eyes [ ] eye irritation [ ] postnasal drip [ ] nasal congestion  CV:                         [ ] negative  [ ] chest pain [ ] orthopnea [ ] palpitations [ ] murmur  Resp:                     [ ] negative [ ] cough [ ] shortness of breath [ ] dyspnea [ ] wheezing [ ] sputum [ ] hemoptysis  GI:                          [ ] negative [ ] nausea [ ] vomiting [ ] diarrhea [ ] constipation [ ] abd pain [ ] dysphagia   :                        [ ] negative [ ] dysuria [ ] nocturia [ ] hematuria [ ] increased urinary frequency  Musculoskeletal: [ ] negative [ ] back pain [ ] myalgias [ ] arthralgias [ ] fracture  Skin:                       [ ] negative [ ] rash [ ] itch  Neurological:        [ ] negative [ ] headache [ ] dizziness [ ] syncope [ ] weakness [ ] numbness  Psychiatric:           [ ] negative [ ] anxiety [ ] depression  Endocrine:            [ ] negative [ ] diabetes [ ] thyroid problem  Heme/Lymph:      [ ] negative [ ] anemia [ ] bleeding problem  Allergic/Immune: [ ] negative [ ] itchy eyes [ ] nasal discharge [ ] hives [ ] angioedema    [ ] All other systems negative  [ ] Unable to assess ROS because ________.    MEDICATIONS:  MEDICATIONS  (STANDING):  amLODIPine   Tablet 5 milliGRAM(s) Oral daily  atorvastatin 40 milliGRAM(s) Oral at bedtime  chlorhexidine 4% Liquid 1 Application(s) Topical <User Schedule>  enalapril 20 milliGRAM(s) Oral two times a day  enoxaparin Injectable 40 milliGRAM(s) SubCutaneous every 24 hours  insulin lispro (ADMELOG) corrective regimen sliding scale   SubCutaneous three times a day before meals  insulin lispro (ADMELOG) corrective regimen sliding scale   SubCutaneous at bedtime    MEDICATIONS  (PRN):      ALLERGIES:  Allergies    No Known Allergies    Intolerances        OBJECTIVE:  ICU Vital Signs Last 24 Hrs  T(C): 36.4 (30 Apr 2022 04:00), Max: 36.8 (29 Apr 2022 18:13)  T(F): 97.5 (30 Apr 2022 04:00), Max: 98.3 (29 Apr 2022 18:13)  HR: 38 (30 Apr 2022 06:00) (36 - 55)  BP: 132/63 (30 Apr 2022 06:00) (111/59 - 198/83)  BP(mean): 90 (30 Apr 2022 06:00) (81 - 125)  ABP: --  ABP(mean): --  RR: 33 (30 Apr 2022 06:00) (18 - 34)  SpO2: 95% (30 Apr 2022 06:00) (92% - 98%)      Adult Advanced Hemodynamics Last 24 Hrs  CVP(mm Hg): --  CVP(cm H2O): --  CO: --  CI: --  PA: --  PA(mean): --  PCWP: --  SVR: --  SVRI: --  PVR: --  PVRI: --  CAPILLARY BLOOD GLUCOSE      POCT Blood Glucose.: 142 mg/dL (30 Apr 2022 01:46)    CAPILLARY BLOOD GLUCOSE      POCT Blood Glucose.: 142 mg/dL (30 Apr 2022 01:46)    I&O's Summary    Daily Height in cm: 162.56 (30 Apr 2022 01:35)    Daily     PHYSICAL EXAMINATION:  General: WN/WD NAD  HEENT: PERRLA, EOMI, moist mucous membranes  Neurology: A&Ox3, nonfocal, ZAZUETA x 4  Respiratory: CTA B/L, normal respiratory effort, no wheezes, crackles, rales  CV: RRR, S1S2, no murmurs, rubs or gallops  Abdominal: Soft, NT, ND +BS, Last BM  Extremities: No edema, + peripheral pulses  Incisions:   Tubes:    LABS:                          13.4   8.12  )-----------( 275      ( 29 Apr 2022 18:54 )             39.4     04-29    145  |  108  |  8<L>  ----------------------------<  148<H>  3.9   |  22  |  0.6<L>    Ca    9.5      29 Apr 2022 18:54  Mg     1.6     04-29    TPro  7.2  /  Alb  4.6  /  TBili  0.7  /  DBili  x   /  AST  27  /  ALT  39  /  AlkPhos  53  04-29    LIVER FUNCTIONS - ( 29 Apr 2022 18:54 )  Alb: 4.6 g/dL / Pro: 7.2 g/dL / ALK PHOS: 53 U/L / ALT: 39 U/L / AST: 27 U/L / GGT: x             Troponin T, Serum: <0.01 ng/mL (04-29 @ 18:54)    CARDIAC MARKERS ( 29 Apr 2022 18:54 )  x     / <0.01 ng/mL / x     / x     / x              TELEMETRY:     EKG:       IMAGING:  Echo:        LVSF:        EF:        RVSF:        LA:        RA:        Mitral Valve:        Aortic Valve:       Tricuspid Valve:        Pulmonic Valve:        Pericardium:     ASSESSMENT & PLAN:

## 2022-04-30 NOTE — PROGRESS NOTE ADULT - ASSESSMENT
Impression  Complete Heart Block with RBBB and LAD, vitally stable   Diabetes Mellitus Type 2  HTN    PLAN:    CNS:   Avoid CNS Depressants     HEENT:   Oral care. Aspiration precautions     PULMONARY:   HOB @ 45. Monitor Pulse Ox. Keep > 93%. Supplement as needed.    CARDIOVASCULAR:   currently HD stable and asymptomatic  CCU monitoring   cont Norvasc and Enalapril    EP eval for PPM next week    GI:   feeding     RENAL:   Avoid nephrotoxic agents, Cr at baseline     INFECTIOUS DISEASE:   no evidence of any underlying infectious process     HEMATOLOGICAL:   heparin for DVT PPX     ENDOCRINE  : Monitor FSG, Check HbA1c, Lipid panel, TSH    MUSCULOSKELETAL: Ambulate as tolerated     CODE STATUS: Full Code     DISPOSITION:  CCU

## 2022-04-30 NOTE — CONSULT NOTE ADULT - ATTENDING COMMENTS
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 Pt has noticed several weeks of fatigue and dyspnea on exertion.     # CHB without signs of hemodynamic compromise    - Rec monitor on tele  - Check TSH, free T4  - Check 2D echo  - Cardiac MRI to eval for infiltrative heart disease (pt states younger sister has ICD)  - Hold all AVN blocking agents  - Check Lyme titers  - Monitor lytes and keep K>4 and Mg>2  - Will follow  - If no reversible agent found, will likely need PPM

## 2022-04-30 NOTE — PROGRESS NOTE ADULT - ATTENDING COMMENTS
Patient with h/o HTN, DM, LBBB admitted with CHB. Patient c/o chest discomfort and fatigue lately.       Keep off BB   Telemetry monitoring   Get full TTE   EP evaluation for PPM Patient with h/o HTN, DM admitted with CHB. Patient c/o chest discomfort and fatigue lately.       Keep off BB   Telemetry monitoring   Get full TTE   EP evaluation for PPM

## 2022-04-30 NOTE — CONSULT NOTE ADULT - ASSESSMENT
72 year old female with PMHx of LBBB, HTN, Diabetes Mellitus Type 2, and mitral valve prolapse presents to the ED following an episode of bradycardia and chest discomfort.    Symptomatic bradycardia secondary to complete heart block  Hx LBBB, HTN, DMII, Mitral valve prolapse    - EKG shows Sinus rhythm with complete heart block  - Will need Echo, TSH  - Hold all BB and CCB  - No evidence of end organ damage  - Cont to monitor in CCU  - Will likely need Pacemaker next week  - Will discuss with EP attending

## 2022-04-30 NOTE — PATIENT PROFILE ADULT - FALL HARM RISK - HARM RISK INTERVENTIONS
Assistance with ambulation/Assistance OOB with selected safe patient handling equipment/Communicate Risk of Fall with Harm to all staff/Reinforce activity limits and safety measures with patient and family/Review medications for side effects contributing to fall risk/Sit up slowly, dangle for a short time, stand at bedside before walking/Tailored Fall Risk Interventions/Toileting schedule using arm’s reach rule for commode and bathroom/Visual Cue: Yellow wristband and red socks/Bed in lowest position, wheels locked, appropriate side rails in place/Call bell, personal items and telephone in reach/Instruct patient to call for assistance before getting out of bed or chair/Non-slip footwear when patient is out of bed/[place] to call system/Physically safe environment - no spills, clutter or unnecessary equipment/Purposeful Proactive Rounding/Room/bathroom lighting operational, light cord in reach

## 2022-05-01 LAB
ALBUMIN SERPL ELPH-MCNC: 3.9 G/DL — SIGNIFICANT CHANGE UP (ref 3.5–5.2)
ALP SERPL-CCNC: 47 U/L — SIGNIFICANT CHANGE UP (ref 30–115)
ALT FLD-CCNC: 27 U/L — SIGNIFICANT CHANGE UP (ref 0–41)
ANION GAP SERPL CALC-SCNC: 10 MMOL/L — SIGNIFICANT CHANGE UP (ref 7–14)
AST SERPL-CCNC: 15 U/L — SIGNIFICANT CHANGE UP (ref 0–41)
B BURGDOR C6 AB SER-ACNC: NEGATIVE — SIGNIFICANT CHANGE UP
B BURGDOR IGG+IGM SER QL IB: SIGNIFICANT CHANGE UP
B BURGDOR IGG+IGM SER-ACNC: 0.11 INDEX — SIGNIFICANT CHANGE UP (ref 0.01–0.89)
BASOPHILS # BLD AUTO: 0.07 K/UL — SIGNIFICANT CHANGE UP (ref 0–0.2)
BASOPHILS NFR BLD AUTO: 1.3 % — HIGH (ref 0–1)
BILIRUB SERPL-MCNC: 0.5 MG/DL — SIGNIFICANT CHANGE UP (ref 0.2–1.2)
BUN SERPL-MCNC: 14 MG/DL — SIGNIFICANT CHANGE UP (ref 10–20)
CALCIUM SERPL-MCNC: 8.7 MG/DL — SIGNIFICANT CHANGE UP (ref 8.5–10.1)
CHLORIDE SERPL-SCNC: 110 MMOL/L — SIGNIFICANT CHANGE UP (ref 98–110)
CHOLEST SERPL-MCNC: 166 MG/DL — SIGNIFICANT CHANGE UP
CO2 SERPL-SCNC: 23 MMOL/L — SIGNIFICANT CHANGE UP (ref 17–32)
CREAT SERPL-MCNC: 0.5 MG/DL — LOW (ref 0.7–1.5)
EGFR: 100 ML/MIN/1.73M2 — SIGNIFICANT CHANGE UP
EOSINOPHIL # BLD AUTO: 0.27 K/UL — SIGNIFICANT CHANGE UP (ref 0–0.7)
EOSINOPHIL NFR BLD AUTO: 4.9 % — SIGNIFICANT CHANGE UP (ref 0–8)
GLUCOSE BLDC GLUCOMTR-MCNC: 102 MG/DL — HIGH (ref 70–99)
GLUCOSE BLDC GLUCOMTR-MCNC: 147 MG/DL — HIGH (ref 70–99)
GLUCOSE BLDC GLUCOMTR-MCNC: 152 MG/DL — HIGH (ref 70–99)
GLUCOSE BLDC GLUCOMTR-MCNC: 175 MG/DL — HIGH (ref 70–99)
GLUCOSE SERPL-MCNC: 144 MG/DL — HIGH (ref 70–99)
HCT VFR BLD CALC: 36.9 % — LOW (ref 37–47)
HDLC SERPL-MCNC: 46 MG/DL — LOW
HGB BLD-MCNC: 12.2 G/DL — SIGNIFICANT CHANGE UP (ref 12–16)
IMM GRANULOCYTES NFR BLD AUTO: 0.2 % — SIGNIFICANT CHANGE UP (ref 0.1–0.3)
LIPID PNL WITH DIRECT LDL SERPL: 98 MG/DL — SIGNIFICANT CHANGE UP
LYMPHOCYTES # BLD AUTO: 1.5 K/UL — SIGNIFICANT CHANGE UP (ref 1.2–3.4)
LYMPHOCYTES # BLD AUTO: 27.5 % — SIGNIFICANT CHANGE UP (ref 20.5–51.1)
MAGNESIUM SERPL-MCNC: 2 MG/DL — SIGNIFICANT CHANGE UP (ref 1.8–2.4)
MCHC RBC-ENTMCNC: 29 PG — SIGNIFICANT CHANGE UP (ref 27–31)
MCHC RBC-ENTMCNC: 33.1 G/DL — SIGNIFICANT CHANGE UP (ref 32–37)
MCV RBC AUTO: 87.6 FL — SIGNIFICANT CHANGE UP (ref 81–99)
MONOCYTES # BLD AUTO: 0.64 K/UL — HIGH (ref 0.1–0.6)
MONOCYTES NFR BLD AUTO: 11.7 % — HIGH (ref 1.7–9.3)
NEUTROPHILS # BLD AUTO: 2.97 K/UL — SIGNIFICANT CHANGE UP (ref 1.4–6.5)
NEUTROPHILS NFR BLD AUTO: 54.4 % — SIGNIFICANT CHANGE UP (ref 42.2–75.2)
NON HDL CHOLESTEROL: 120 MG/DL — SIGNIFICANT CHANGE UP
NRBC # BLD: 0 /100 WBCS — SIGNIFICANT CHANGE UP (ref 0–0)
PLATELET # BLD AUTO: 236 K/UL — SIGNIFICANT CHANGE UP (ref 130–400)
POTASSIUM SERPL-MCNC: 4.3 MMOL/L — SIGNIFICANT CHANGE UP (ref 3.5–5)
POTASSIUM SERPL-SCNC: 4.3 MMOL/L — SIGNIFICANT CHANGE UP (ref 3.5–5)
PROT SERPL-MCNC: 6.1 G/DL — SIGNIFICANT CHANGE UP (ref 6–8)
RBC # BLD: 4.21 M/UL — SIGNIFICANT CHANGE UP (ref 4.2–5.4)
RBC # FLD: 14.7 % — HIGH (ref 11.5–14.5)
SODIUM SERPL-SCNC: 143 MMOL/L — SIGNIFICANT CHANGE UP (ref 135–146)
TRIGL SERPL-MCNC: 107 MG/DL — SIGNIFICANT CHANGE UP
TROPONIN T SERPL-MCNC: <0.01 NG/ML — SIGNIFICANT CHANGE UP
WBC # BLD: 5.46 K/UL — SIGNIFICANT CHANGE UP (ref 4.8–10.8)
WBC # FLD AUTO: 5.46 K/UL — SIGNIFICANT CHANGE UP (ref 4.8–10.8)

## 2022-05-01 PROCEDURE — 93010 ELECTROCARDIOGRAM REPORT: CPT

## 2022-05-01 PROCEDURE — 99233 SBSQ HOSP IP/OBS HIGH 50: CPT

## 2022-05-01 PROCEDURE — 71045 X-RAY EXAM CHEST 1 VIEW: CPT | Mod: 26

## 2022-05-01 RX ORDER — FUROSEMIDE 40 MG
40 TABLET ORAL ONCE
Refills: 0 | Status: COMPLETED | OUTPATIENT
Start: 2022-05-01 | End: 2022-05-01

## 2022-05-01 RX ADMIN — Medication 40 MILLIGRAM(S): at 13:06

## 2022-05-01 RX ADMIN — Medication 1200 MILLIGRAM(S): at 12:00

## 2022-05-01 RX ADMIN — Medication 1200 MILLIGRAM(S): at 17:13

## 2022-05-01 RX ADMIN — Medication 1: at 15:59

## 2022-05-01 RX ADMIN — CHLORHEXIDINE GLUCONATE 1 APPLICATION(S): 213 SOLUTION TOPICAL at 05:32

## 2022-05-01 RX ADMIN — Medication 20 MILLIGRAM(S): at 17:04

## 2022-05-01 RX ADMIN — AMLODIPINE BESYLATE 5 MILLIGRAM(S): 2.5 TABLET ORAL at 11:59

## 2022-05-01 RX ADMIN — Medication 20 MILLIGRAM(S): at 05:32

## 2022-05-01 RX ADMIN — ATORVASTATIN CALCIUM 40 MILLIGRAM(S): 80 TABLET, FILM COATED ORAL at 21:32

## 2022-05-01 RX ADMIN — Medication 1200 MILLIGRAM(S): at 05:32

## 2022-05-01 RX ADMIN — Medication 1: at 08:04

## 2022-05-01 NOTE — PROGRESS NOTE ADULT - ASSESSMENT
Cardiology: Dr. Chino    72 year old female with PMH of LBBB, HTN, Diabetes Mellitus Type 2, and mitral valve prolapse presents to the ED following an episode of bradycardia and chest discomfort following a colonoscopy. Found to be in CHB 40s.     Lyme neg. EF74%.    Impression:  CHB 40s   Hx LBBB, HTN, DMII, Mitral valve prolapse  Hx severe diverticulosis (colonoscopy 4/29/22)    Plan:  - Pt will require PPM if no reversible cause found  - Hold all BB and CCB (bystolic PTA)  - FU TSH  - Cardiac MRI to r/o infiltrative disease  - OR tentatively Monday vs Tuesday  - Cont tele  - Maintain k>4 and mag>2    EPS [id number]

## 2022-05-01 NOTE — PROGRESS NOTE ADULT - ATTENDING COMMENTS
Patient with CHB with junctional escape rhythm, c/o fatigue prior to admission. Hemodynamically stable, euvolemic.       Continue telemetry monitoring   CMR to rule out infiltrative dz   TTE shows preserved LVEF with PASP 51 mmHg   EP follow up for PPM

## 2022-05-01 NOTE — PROGRESS NOTE ADULT - ASSESSMENT
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 Aspiration precautions     PULMONARY:   HOB @ 45. Monitor Pulse Ox. Keep > 93%. Supplement as needed.    CARDIOVASCULAR:   currently HD stable and asymptomatic  CCU monitoring   cont Norvasc and Enalapril    Avoid AVNRT  EP eval for PPM next week    GI:   feeding     RENAL:   Avoid nephrotoxic agents, Cr at baseline     INFECTIOUS DISEASE:   no evidence of any underlying infectious process     HEMATOLOGICAL:   heparin for DVT PPX     ENDOCRINE  : Monitor FSG, Check HbA1c, Lipid panel, TSH    MUSCULOSKELETAL: Ambulate as tolerated     CODE STATUS: Full Code     DISPOSITION:  CCU

## 2022-05-01 NOTE — PROGRESS NOTE ADULT - SUBJECTIVE AND OBJECTIVE BOX
LENGTH OF HOSPITAL STAY: 2d    CHIEF COMPLAINT:    Patient is a 72y old  Female who presents with a chief complaint of INCOMPLETE NOTE (2022 07:17)    OVERNIGHT EVENTS:    - no events overnight  - Pt examined at bedside, denies CP, SOB, n/v denies urinary sxs; endorses many BMs    FOLLOW UP:    - EP for PPM    HOSPITAL COURSE:    - pt has had uneventful stay in CCU     HPI:    HPI:  Patient is a 72 year old female with PMHx opf LBBB, HTN, Diabetes Mellitus Type 2, and mitral valve prolapse presents to the ED following an episode of bradycardia and chest discomfort. Patient was planned for colonoscopy today when her HS was found to be 35 - 45 bpm. Patient immediately  called her cardiologist Dr. Chino who recommenced patient report to the ED for evaluation. At patients last outpatient cardiology visit in 2022, ekg demonstrated baseline LBBB. Patient denies headache, blurred vision, syncope, dizziness, chest pain, shortness of breath, palpitations, diaphoresis, abdominal pain, or nausea/vomitting/diarrhea.      In the ED, patient initially hypertensive to 198/92 with HR of 45. Hypertension self resolved, now 133/55 with HR of 55. Labs revealing Mg 1.6 and BNP 1132. Troponin negative x1. EKG demonstrating complete heart block. Patient is afebrile and saturating well on room air. Patient currently asymptomatic. She was evaluated by the cardiology fellow, recommending admission to CCU for monitoring.       (2022 20:44)      ALLERGIES:    No Known Allergies    PMHx:    Hypertension; Type 1 diabetes mellitus without complication; Mitral insufficiency; Family history of medical problems; cad, lbbb, hyperchoesteremia; Bilateral bunions;  delivery delivered; History of surgery; tonsillectomy, hysterectomy, b/l eye surgery for muscle, knee miniscus sx right    SOCIAL Hx:    - from home no needs    MEDICATIONS:  STANDING MEDICATIONS  amLODIPine   Tablet 5 milliGRAM(s) Oral daily  atorvastatin 40 milliGRAM(s) Oral at bedtime  chlorhexidine 4% Liquid 1 Application(s) Topical <User Schedule>  enalapril 20 milliGRAM(s) Oral two times a day  insulin lispro (ADMELOG) corrective regimen sliding scale   SubCutaneous three times a day before meals  insulin lispro (ADMELOG) corrective regimen sliding scale   SubCutaneous at bedtime  mesalamine DR Capsule 1200 milliGRAM(s) Oral every 8 hours    PRN MEDICATIONS  acetaminophen     Tablet .. 650 milliGRAM(s) Oral every 6 hours PRN      LABS:                        12.2   5.46  )-----------( 236      ( 01 May 2022 06:40 )             36.9     05    143  |  110  |  14  ----------------------------<  144<H>  4.3   |  23  |  0.5<L>    Ca    8.7      01 May 2022 06:40  Mg     2.0         TPro  6.1  /  Alb  3.9  /  TBili  0.5  /  DBili  x   /  AST  15  /  ALT  27  /  AlkPhos  47            Troponin T, Serum: <0.01 ng/mL (22 @ 06:40)      CARDIAC MARKERS ( 01 May 2022 06:40 )  x     / <0.01 ng/mL / x     / x     / x      CARDIAC MARKERS ( 2022 18:54 )  x     / <0.01 ng/mL / x     / x     / x        VITALS:   T(F): 97.9  HR: 39  BP: 170/75  RR: 22  SpO2: 97%        PHYSICAL EXAM:    Gen: NAD, resting in bed  HEENT: Normocephalic, atraumatic  Neck: supple, no lymphadenopathy  CV: javier & regular rhythm  Lungs: decreased BS at bases, no fremitus  Abdomen: Soft, BS present, non tender  Ext: Warm, well perfused, no edema  Neuro: non focal, awake, moves all extremities equally against resistance  Skin: no rash, no erythema   LENGTH OF HOSPITAL STAY: 2d    CHIEF COMPLAINT:    Patient is a 72y old  Female who presents with a chief complaint of INCOMPLETE NOTE (2022 07:17)    OVERNIGHT EVENTS:    - no events overnight  - Pt examined at bedside, denies CP, SOB, n/v denies urinary sxs; endorses many BMs    FOLLOW UP:    - EP for PPM    HOSPITAL COURSE:    - pt has had uneventful stay in CCU     ALLERGIES:    No Known Allergies    PMHx:    Hypertension; Type 1 diabetes mellitus without complication; Mitral insufficiency; Family history of medical problems; cad, lbbb, hyperchoesteremia; Bilateral bunions;  delivery delivered; History of surgery; tonsillectomy, hysterectomy, b/l eye surgery for muscle, knee miniscus sx right    SOCIAL Hx:    - from home no needs    MEDICATIONS:  STANDING MEDICATIONS  amLODIPine   Tablet 5 milliGRAM(s) Oral daily  atorvastatin 40 milliGRAM(s) Oral at bedtime  chlorhexidine 4% Liquid 1 Application(s) Topical <User Schedule>  enalapril 20 milliGRAM(s) Oral two times a day  insulin lispro (ADMELOG) corrective regimen sliding scale   SubCutaneous three times a day before meals  insulin lispro (ADMELOG) corrective regimen sliding scale   SubCutaneous at bedtime  mesalamine DR Capsule 1200 milliGRAM(s) Oral every 8 hours    PRN MEDICATIONS  acetaminophen     Tablet .. 650 milliGRAM(s) Oral every 6 hours PRN      LABS:                        12.2   5.46  )-----------( 236      ( 01 May 2022 06:40 )             36.9     05    143  |  110  |  14  ----------------------------<  144<H>  4.3   |  23  |  0.5<L>    Ca    8.7      01 May 2022 06:40  Mg     2.0         TPro  6.1  /  Alb  3.9  /  TBili  0.5  /  DBili  x   /  AST  15  /  ALT  27  /  AlkPhos  47  05          Troponin T, Serum: <0.01 ng/mL (22 @ 06:40)      CARDIAC MARKERS ( 01 May 2022 06:40 )  x     / <0.01 ng/mL / x     / x     / x      CARDIAC MARKERS ( 2022 18:54 )  x     / <0.01 ng/mL / x     / x     / x        VITALS:   T(F): 97.9  HR: 39  BP: 170/75  RR: 22  SpO2: 97%        PHYSICAL EXAM:    Gen: NAD, resting in bed  HEENT: Normocephalic, atraumatic  Neck: supple, no lymphadenopathy  CV: javier & regular rhythm  Lungs: decreased BS at bases, no fremitus  Abdomen: Soft, BS present, non tender  Ext: Warm, well perfused, no edema  Neuro: non focal, awake, moves all extremities equally against resistance  Skin: no rash, no erythema

## 2022-05-01 NOTE — PROGRESS NOTE ADULT - SUBJECTIVE AND OBJECTIVE BOX
INTERVAL HPI/OVERNIGHT EVENTS:  No acute events overnight, remains in CHB 40s- asymptomatic.   Patient denies fever, chills, dizziness, syncope, chest pain, palpitations, SOB, cough, abd pain, n/v/d/c, dysuria, hematuria or unusual rash.     MEDICATIONS  (STANDING):  amLODIPine   Tablet 5 milliGRAM(s) Oral daily  atorvastatin 40 milliGRAM(s) Oral at bedtime  chlorhexidine 4% Liquid 1 Application(s) Topical <User Schedule>  enalapril 20 milliGRAM(s) Oral two times a day  furosemide   Injectable 40 milliGRAM(s) IV Push once  insulin lispro (ADMELOG) corrective regimen sliding scale   SubCutaneous three times a day before meals  insulin lispro (ADMELOG) corrective regimen sliding scale   SubCutaneous at bedtime  mesalamine DR Capsule 1200 milliGRAM(s) Oral every 8 hours    MEDICATIONS  (PRN):  acetaminophen     Tablet .. 650 milliGRAM(s) Oral every 6 hours PRN Moderate Pain (4 - 6)      Allergies    No Known Allergies    Intolerances        REVIEW OF SYSTEMS    [x] A ten-point review of systems was otherwise negative except as noted.  [ ] Due to altered mental status/intubation, subjective information were not able to be obtained from the patient. History was obtained, to the extent possible, from review of the chart and collateral sources of information.      Vital Signs Last 24 Hrs  T(C): 36.6 (01 May 2022 12:00), Max: 36.7 (30 Apr 2022 16:00)  T(F): 97.9 (01 May 2022 12:00), Max: 98 (30 Apr 2022 16:00)  HR: 43 (01 May 2022 10:00) (36 - 43)  BP: 144/67 (01 May 2022 12:00) (132/60 - 180/79)  BP(mean): 96 (01 May 2022 12:00) (86 - 119)  RR: 35 (01 May 2022 10:00) (16 - 35)  SpO2: 98% (01 May 2022 12:00) (96% - 98%)    04-30-22 @ 07:01  -  05-01-22 @ 07:00  --------------------------------------------------------  IN: 995 mL / OUT: 5 mL / NET: 990 mL    05-01-22 @ 07:01  -  05-01-22 @ 12:13  --------------------------------------------------------  IN: 240 mL / OUT: 2 mL / NET: 238 mL        Physical Exam  GENERAL: In no apparent distress, well nourished, and hydrated.  HEART: Bradycardia; No murmurs, rubs, or gallops.  PULMONARY: Clear to auscultation and perfusion.  No rales, wheezing, or rhonchi bilaterally.  ABDOMEN: Soft, Nontender, Nondistended; Bowel sounds present  EXTREMITIES:  2+ Peripheral Pulses, No clubbing, cyanosis, or edema  NEUROLOGICAL: AO x4 ,ZAZUETA, speech clear    LABS:                        12.2   5.46  )-----------( 236      ( 01 May 2022 06:40 )             36.9     05-01    143  |  110  |  14  ----------------------------<  144<H>  4.3   |  23  |  0.5<L>    Ca    8.7      01 May 2022 06:40  Mg     2.0     05-01    TPro  6.1  /  Alb  3.9  /  TBili  0.5  /  DBili  x   /  AST  15  /  ALT  27  /  AlkPhos  47  05-01 04-30-22 @ 07:01  -  05-01-22 @ 07:00  --------------------------------------------------------  IN: 995 mL / OUT: 5 mL / NET: 990 mL    05-01-22 @ 07:01  -  05-01-22 @ 12:13  --------------------------------------------------------  IN: 240 mL / OUT: 2 mL / NET: 238 mL    04-30-22 @ 07:01  -  05-01-22 @ 07:00  --------------------------------------------------------  IN: 995 mL / OUT: 5 mL / NET: 990 mL    05-01-22 @ 07:01  -  05-01-22 @ 12:13  --------------------------------------------------------  IN: 240 mL / OUT: 2 mL / NET: 238 mL        RADIOLOGY:  < from: TTE Echo Complete w/o Contrast w/ Doppler (04.30.22 @ 11:46) >  Summary:   1. Normal global left ventricular systolic function.   2. Moderate to severe left atrial enlargement.   3. LV Ejection Fraction by Hu's Method with a biplane EF of 74 %.   4. Mildly increased LV wall thickness.   5. Spectral Doppler shows impaired relaxation pattern of left   ventricular myocardial filling (Grade I diastolic dysfunction).   6. Mildly enlarged right atrium.   7. Mild to moderate mitral valve regurgitation.   8. Mitral annular calcification.   9. Thickening and calcification of the anterior and posterior mitral  valve leaflets.  10. Mild-moderate tricuspid regurgitation.  11. Mild aortic regurgitation.  12. Sclerotic aortic valve with normal opening.  13. Mild pulmonic valve regurgitation.  14. Estimated pulmonary artery systolic pressure is 51.3 mmHg assuming a   right atrial pressure of 15 mmHg, which is consistent with moderate   pulmonary hypertension.    PHYSICIAN INTERPRETATION:  Left Ventricle: The left ventricular internal cavity size is normal. Left   ventricular wall thickness is mildly increased. Global LV systolic   function was normal. Spectral Doppler shows impaired relaxation pattern   of left ventricular myocardial filling (Grade I diastolic dysfunction).  Right Ventricle: Normal right ventricular size and function.  Left Atrium: Moderate to severe left atrial enlargement.  Right Atrium: Mildly enlarged right atrium. RA Area is 19.64 cm² cm2.  Pericardium: There is no evidence of pericardial effusion.  Mitral Valve: Thickening and calcification of the anterior and posterior   mitral valve leaflets. There is mitral annular calcification. No evidence   of mitral stenosis. Mild to moderate mitral valve regurgitation is seen.  Tricuspid Valve: Mild-moderate tricuspid regurgitation is visualized.   Estimated pulmonary artery systolic pressure is 51.3 mmHg assuming a   right atrial pressure of 15 mmHg, which is consistent with moderate   pulmonary hypertension.  Aortic Valve: Sclerotic aortic valve with normal opening. Mild aortic   valve regurgitation is seen.  Pulmonic Valve: Mild pulmonic valve regurgitation.  Aorta: The aortic root is normal in size and structure.    < end of copied text >       INTERVAL HPI/OVERNIGHT EVENTS:  No acute events overnight, remains in CHB 40s- asymptomatic.   Patient denies fever, chills, dizziness, syncope, chest pain, palpitations, SOB, cough, abd pain, n/v/d/c, dysuria, hematuria or unusual rash.     MEDICATIONS  (STANDING):  amLODIPine   Tablet 5 milliGRAM(s) Oral daily  atorvastatin 40 milliGRAM(s) Oral at bedtime  chlorhexidine 4% Liquid 1 Application(s) Topical <User Schedule>  enalapril 20 milliGRAM(s) Oral two times a day  furosemide   Injectable 40 milliGRAM(s) IV Push once  insulin lispro (ADMELOG) corrective regimen sliding scale   SubCutaneous three times a day before meals  insulin lispro (ADMELOG) corrective regimen sliding scale   SubCutaneous at bedtime  mesalamine DR Capsule 1200 milliGRAM(s) Oral every 8 hours    MEDICATIONS  (PRN):  acetaminophen     Tablet .. 650 milliGRAM(s) Oral every 6 hours PRN Moderate Pain (4 - 6)      Allergies    No Known Allergies    Intolerances        REVIEW OF SYSTEMS    [x] A ten-point review of systems was otherwise negative except as noted.  [ ] Due to altered mental status/intubation, subjective information were not able to be obtained from the patient. History was obtained, to the extent possible, from review of the chart and collateral sources of information.      Vital Signs Last 24 Hrs  T(C): 36.6 (01 May 2022 12:00), Max: 36.7 (30 Apr 2022 16:00)  T(F): 97.9 (01 May 2022 12:00), Max: 98 (30 Apr 2022 16:00)  HR: 43 (01 May 2022 10:00) (36 - 43)  BP: 144/67 (01 May 2022 12:00) (132/60 - 180/79)  BP(mean): 96 (01 May 2022 12:00) (86 - 119)  RR: 35 (01 May 2022 10:00) (16 - 35)  SpO2: 98% (01 May 2022 12:00) (96% - 98%)    04-30-22 @ 07:01  -  05-01-22 @ 07:00  --------------------------------------------------------  IN: 995 mL / OUT: 5 mL / NET: 990 mL    05-01-22 @ 07:01  -  05-01-22 @ 12:13  --------------------------------------------------------  IN: 240 mL / OUT: 2 mL / NET: 238 mL        Physical Exam  GENERAL: In no apparent distress, well nourished, and hydrated.  HEART: Bradycardic; No murmurs, rubs, or gallops.  PULMONARY: Clear to auscultation and perfusion.  No rales, wheezing, or rhonchi bilaterally.  ABDOMEN: Soft, Nontender, Nondistended; Bowel sounds present  EXTREMITIES:  2+ Peripheral Pulses, No clubbing, cyanosis, or edema  NEUROLOGICAL: AO x4 ,ZAZUETA, speech clear    LABS:                        12.2   5.46  )-----------( 236      ( 01 May 2022 06:40 )             36.9     05-01    143  |  110  |  14  ----------------------------<  144<H>  4.3   |  23  |  0.5<L>    Ca    8.7      01 May 2022 06:40  Mg     2.0     05-01    TPro  6.1  /  Alb  3.9  /  TBili  0.5  /  DBili  x   /  AST  15  /  ALT  27  /  AlkPhos  47  05-01 04-30-22 @ 07:01 - 05-01-22 @ 07:00  --------------------------------------------------------  IN: 995 mL / OUT: 5 mL / NET: 990 mL    05-01-22 @ 07:01 - 05-01-22 @ 12:13  --------------------------------------------------------  IN: 240 mL / OUT: 2 mL / NET: 238 mL    04-30-22 @ 07:01  -  05-01-22 @ 07:00  --------------------------------------------------------  IN: 995 mL / OUT: 5 mL / NET: 990 mL    05-01-22 @ 07:01  -  05-01-22 @ 12:13  --------------------------------------------------------  IN: 240 mL / OUT: 2 mL / NET: 238 mL        RADIOLOGY:  < from: TTE Echo Complete w/o Contrast w/ Doppler (04.30.22 @ 11:46) >  Summary:   1. Normal global left ventricular systolic function.   2. Moderate to severe left atrial enlargement.   3. LV Ejection Fraction by Hu's Method with a biplane EF of 74 %.   4. Mildly increased LV wall thickness.   5. Spectral Doppler shows impaired relaxation pattern of left   ventricular myocardial filling (Grade I diastolic dysfunction).   6. Mildly enlarged right atrium.   7. Mild to moderate mitral valve regurgitation.   8. Mitral annular calcification.   9. Thickening and calcification of the anterior and posterior mitral  valve leaflets.  10. Mild-moderate tricuspid regurgitation.  11. Mild aortic regurgitation.  12. Sclerotic aortic valve with normal opening.  13. Mild pulmonic valve regurgitation.  14. Estimated pulmonary artery systolic pressure is 51.3 mmHg assuming a   right atrial pressure of 15 mmHg, which is consistent with moderate   pulmonary hypertension.    PHYSICIAN INTERPRETATION:  Left Ventricle: The left ventricular internal cavity size is normal. Left   ventricular wall thickness is mildly increased. Global LV systolic   function was normal. Spectral Doppler shows impaired relaxation pattern   of left ventricular myocardial filling (Grade I diastolic dysfunction).  Right Ventricle: Normal right ventricular size and function.  Left Atrium: Moderate to severe left atrial enlargement.  Right Atrium: Mildly enlarged right atrium. RA Area is 19.64 cm² cm2.  Pericardium: There is no evidence of pericardial effusion.  Mitral Valve: Thickening and calcification of the anterior and posterior   mitral valve leaflets. There is mitral annular calcification. No evidence   of mitral stenosis. Mild to moderate mitral valve regurgitation is seen.  Tricuspid Valve: Mild-moderate tricuspid regurgitation is visualized.   Estimated pulmonary artery systolic pressure is 51.3 mmHg assuming a   right atrial pressure of 15 mmHg, which is consistent with moderate   pulmonary hypertension.  Aortic Valve: Sclerotic aortic valve with normal opening. Mild aortic   valve regurgitation is seen.  Pulmonic Valve: Mild pulmonic valve regurgitation.  Aorta: The aortic root is normal in size and structure.    < end of copied text >

## 2022-05-02 LAB
A1C WITH ESTIMATED AVERAGE GLUCOSE RESULT: 7.4 % — HIGH (ref 4–5.6)
ALBUMIN SERPL ELPH-MCNC: 4.1 G/DL — SIGNIFICANT CHANGE UP (ref 3.5–5.2)
ALP SERPL-CCNC: 51 U/L — SIGNIFICANT CHANGE UP (ref 30–115)
ALT FLD-CCNC: 23 U/L — SIGNIFICANT CHANGE UP (ref 0–41)
ANION GAP SERPL CALC-SCNC: 10 MMOL/L — SIGNIFICANT CHANGE UP (ref 7–14)
ANION GAP SERPL CALC-SCNC: 11 MMOL/L — SIGNIFICANT CHANGE UP (ref 7–14)
AST SERPL-CCNC: 11 U/L — SIGNIFICANT CHANGE UP (ref 0–41)
B BURGDOR C6 AB SER-ACNC: NEGATIVE — SIGNIFICANT CHANGE UP
B BURGDOR IGG+IGM SER-ACNC: 0.05 INDEX — SIGNIFICANT CHANGE UP (ref 0.01–0.89)
BASOPHILS # BLD AUTO: 0.06 K/UL — SIGNIFICANT CHANGE UP (ref 0–0.2)
BASOPHILS NFR BLD AUTO: 1 % — SIGNIFICANT CHANGE UP (ref 0–1)
BILIRUB SERPL-MCNC: 0.6 MG/DL — SIGNIFICANT CHANGE UP (ref 0.2–1.2)
BUN SERPL-MCNC: 17 MG/DL — SIGNIFICANT CHANGE UP (ref 10–20)
BUN SERPL-MCNC: 18 MG/DL — SIGNIFICANT CHANGE UP (ref 10–20)
CALCIUM SERPL-MCNC: 9.3 MG/DL — SIGNIFICANT CHANGE UP (ref 8.5–10.1)
CALCIUM SERPL-MCNC: 9.6 MG/DL — SIGNIFICANT CHANGE UP (ref 8.5–10.1)
CHLORIDE SERPL-SCNC: 105 MMOL/L — SIGNIFICANT CHANGE UP (ref 98–110)
CHLORIDE SERPL-SCNC: 106 MMOL/L — SIGNIFICANT CHANGE UP (ref 98–110)
CO2 SERPL-SCNC: 25 MMOL/L — SIGNIFICANT CHANGE UP (ref 17–32)
CO2 SERPL-SCNC: 27 MMOL/L — SIGNIFICANT CHANGE UP (ref 17–32)
CREAT SERPL-MCNC: 0.6 MG/DL — LOW (ref 0.7–1.5)
CREAT SERPL-MCNC: 0.7 MG/DL — SIGNIFICANT CHANGE UP (ref 0.7–1.5)
EGFR: 92 ML/MIN/1.73M2 — SIGNIFICANT CHANGE UP
EGFR: 96 ML/MIN/1.73M2 — SIGNIFICANT CHANGE UP
EOSINOPHIL # BLD AUTO: 0.33 K/UL — SIGNIFICANT CHANGE UP (ref 0–0.7)
EOSINOPHIL NFR BLD AUTO: 5.2 % — SIGNIFICANT CHANGE UP (ref 0–8)
ESTIMATED AVERAGE GLUCOSE: 166 MG/DL — HIGH (ref 68–114)
GLUCOSE BLDC GLUCOMTR-MCNC: 125 MG/DL — HIGH (ref 70–99)
GLUCOSE BLDC GLUCOMTR-MCNC: 142 MG/DL — HIGH (ref 70–99)
GLUCOSE BLDC GLUCOMTR-MCNC: 193 MG/DL — HIGH (ref 70–99)
GLUCOSE BLDC GLUCOMTR-MCNC: 204 MG/DL — HIGH (ref 70–99)
GLUCOSE SERPL-MCNC: 158 MG/DL — HIGH (ref 70–99)
GLUCOSE SERPL-MCNC: 209 MG/DL — HIGH (ref 70–99)
HCT VFR BLD CALC: 37.7 % — SIGNIFICANT CHANGE UP (ref 37–47)
HGB BLD-MCNC: 12.5 G/DL — SIGNIFICANT CHANGE UP (ref 12–16)
IMM GRANULOCYTES NFR BLD AUTO: 0.3 % — SIGNIFICANT CHANGE UP (ref 0.1–0.3)
INR BLD: 1.07 RATIO — SIGNIFICANT CHANGE UP (ref 0.65–1.3)
LYMPHOCYTES # BLD AUTO: 1.52 K/UL — SIGNIFICANT CHANGE UP (ref 1.2–3.4)
LYMPHOCYTES # BLD AUTO: 24.1 % — SIGNIFICANT CHANGE UP (ref 20.5–51.1)
MAGNESIUM SERPL-MCNC: 1.7 MG/DL — LOW (ref 1.8–2.4)
MCHC RBC-ENTMCNC: 28.7 PG — SIGNIFICANT CHANGE UP (ref 27–31)
MCHC RBC-ENTMCNC: 33.2 G/DL — SIGNIFICANT CHANGE UP (ref 32–37)
MCV RBC AUTO: 86.7 FL — SIGNIFICANT CHANGE UP (ref 81–99)
MONOCYTES # BLD AUTO: 0.76 K/UL — HIGH (ref 0.1–0.6)
MONOCYTES NFR BLD AUTO: 12.1 % — HIGH (ref 1.7–9.3)
NEUTROPHILS # BLD AUTO: 3.61 K/UL — SIGNIFICANT CHANGE UP (ref 1.4–6.5)
NEUTROPHILS NFR BLD AUTO: 57.3 % — SIGNIFICANT CHANGE UP (ref 42.2–75.2)
NRBC # BLD: 0 /100 WBCS — SIGNIFICANT CHANGE UP (ref 0–0)
PLATELET # BLD AUTO: 245 K/UL — SIGNIFICANT CHANGE UP (ref 130–400)
POTASSIUM SERPL-MCNC: 3.6 MMOL/L — SIGNIFICANT CHANGE UP (ref 3.5–5)
POTASSIUM SERPL-MCNC: 4.4 MMOL/L — SIGNIFICANT CHANGE UP (ref 3.5–5)
POTASSIUM SERPL-SCNC: 3.6 MMOL/L — SIGNIFICANT CHANGE UP (ref 3.5–5)
POTASSIUM SERPL-SCNC: 4.4 MMOL/L — SIGNIFICANT CHANGE UP (ref 3.5–5)
PROT SERPL-MCNC: 6.1 G/DL — SIGNIFICANT CHANGE UP (ref 6–8)
PROTHROM AB SERPL-ACNC: 12.3 SEC — SIGNIFICANT CHANGE UP (ref 9.95–12.87)
RAPID RVP RESULT: SIGNIFICANT CHANGE UP
RBC # BLD: 4.35 M/UL — SIGNIFICANT CHANGE UP (ref 4.2–5.4)
RBC # FLD: 14.5 % — SIGNIFICANT CHANGE UP (ref 11.5–14.5)
SARS-COV-2 RNA SPEC QL NAA+PROBE: SIGNIFICANT CHANGE UP
SODIUM SERPL-SCNC: 142 MMOL/L — SIGNIFICANT CHANGE UP (ref 135–146)
SODIUM SERPL-SCNC: 142 MMOL/L — SIGNIFICANT CHANGE UP (ref 135–146)
TSH SERPL-MCNC: 1.06 UIU/ML — SIGNIFICANT CHANGE UP (ref 0.27–4.2)
TSH SERPL-MCNC: 1.07 UIU/ML — SIGNIFICANT CHANGE UP (ref 0.27–4.2)
WBC # BLD: 6.3 K/UL — SIGNIFICANT CHANGE UP (ref 4.8–10.8)
WBC # FLD AUTO: 6.3 K/UL — SIGNIFICANT CHANGE UP (ref 4.8–10.8)

## 2022-05-02 PROCEDURE — 71045 X-RAY EXAM CHEST 1 VIEW: CPT | Mod: 26

## 2022-05-02 PROCEDURE — 99291 CRITICAL CARE FIRST HOUR: CPT | Mod: 57

## 2022-05-02 PROCEDURE — 93010 ELECTROCARDIOGRAM REPORT: CPT

## 2022-05-02 PROCEDURE — 75561 CARDIAC MRI FOR MORPH W/DYE: CPT | Mod: 26

## 2022-05-02 RX ORDER — POTASSIUM CHLORIDE 20 MEQ
20 PACKET (EA) ORAL
Refills: 0 | Status: COMPLETED | OUTPATIENT
Start: 2022-05-02 | End: 2022-05-02

## 2022-05-02 RX ORDER — MAGNESIUM SULFATE 500 MG/ML
2 VIAL (ML) INJECTION ONCE
Refills: 0 | Status: COMPLETED | OUTPATIENT
Start: 2022-05-02 | End: 2022-05-02

## 2022-05-02 RX ORDER — FUROSEMIDE 40 MG
40 TABLET ORAL ONCE
Refills: 0 | Status: COMPLETED | OUTPATIENT
Start: 2022-05-02 | End: 2022-05-02

## 2022-05-02 RX ORDER — ACETAMINOPHEN 500 MG
650 TABLET ORAL ONCE
Refills: 0 | Status: COMPLETED | OUTPATIENT
Start: 2022-05-02 | End: 2022-05-02

## 2022-05-02 RX ORDER — POTASSIUM CHLORIDE 20 MEQ
40 PACKET (EA) ORAL ONCE
Refills: 0 | Status: DISCONTINUED | OUTPATIENT
Start: 2022-05-02 | End: 2022-05-02

## 2022-05-02 RX ADMIN — ATORVASTATIN CALCIUM 40 MILLIGRAM(S): 80 TABLET, FILM COATED ORAL at 21:15

## 2022-05-02 RX ADMIN — Medication 20 MILLIEQUIVALENT(S): at 07:00

## 2022-05-02 RX ADMIN — Medication 2: at 17:28

## 2022-05-02 RX ADMIN — Medication 25 GRAM(S): at 10:56

## 2022-05-02 RX ADMIN — Medication 1200 MILLIGRAM(S): at 21:14

## 2022-05-02 RX ADMIN — Medication 20 MILLIGRAM(S): at 05:38

## 2022-05-02 RX ADMIN — CHLORHEXIDINE GLUCONATE 1 APPLICATION(S): 213 SOLUTION TOPICAL at 05:39

## 2022-05-02 RX ADMIN — Medication 650 MILLIGRAM(S): at 19:42

## 2022-05-02 RX ADMIN — Medication 1200 MILLIGRAM(S): at 15:26

## 2022-05-02 RX ADMIN — Medication 650 MILLIGRAM(S): at 12:48

## 2022-05-02 RX ADMIN — Medication 20 MILLIGRAM(S): at 17:28

## 2022-05-02 RX ADMIN — Medication 20 MILLIEQUIVALENT(S): at 12:25

## 2022-05-02 RX ADMIN — Medication 25 GRAM(S): at 06:47

## 2022-05-02 RX ADMIN — Medication 20 MILLIEQUIVALENT(S): at 09:11

## 2022-05-02 RX ADMIN — Medication 1200 MILLIGRAM(S): at 05:38

## 2022-05-02 RX ADMIN — AMLODIPINE BESYLATE 5 MILLIGRAM(S): 2.5 TABLET ORAL at 12:25

## 2022-05-02 RX ADMIN — Medication 650 MILLIGRAM(S): at 20:12

## 2022-05-02 RX ADMIN — Medication 40 MILLIGRAM(S): at 10:56

## 2022-05-02 NOTE — PROGRESS NOTE ADULT - ATTENDING COMMENTS
Seen / examined and above reviewed.    Baseline LBBB    SR with CHB (stable ventricular escape)    Persistent CHB off beta-blocker.  BP / renal function stable.    MRI today  PPM v. AICD pending results.    Critically ill patient with life threatening condition.

## 2022-05-02 NOTE — PROGRESS NOTE ADULT - ASSESSMENT
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 Aspiration precautions     PULMONARY:   HOB @ 45. Monitor Pulse Ox. Keep > 93%. Supplement as needed.    CARDIOVASCULAR:   currently HD stable and asymptomatic  CCU monitoring   cont Norvasc and Enalapril    Avoid AVNRT  MR heart r/o structural disease - amyloidosis/sarcoidosis etc..  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 Aspiration precautions     PULMONARY:   HOB @ 45. Monitor Pulse Ox. Keep > 93%. Supplement as needed.    CARDIOVASCULAR:   currently HD stable and asymptomatic  CCU monitoring   cont Norvasc and Enalapril    Avoid AVNRT  MR heart r/o structural disease - amyloidosis/sarcoidosis etc..  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 Aspiration precautions     PULMONARY:   HOB @ 45. Monitor Pulse Ox. Keep > 93%. Supplement as needed.    CARDIOVASCULAR:   currently HD stable and asymptomatic  CCU monitoring   cont Norvasc and Enalapril    Avoid AVNRT  MR heart r/o infiltrative disease  For PPM w/ EP after    GI:   feeding     RENAL:   Avoid nephrotoxic agents, Cr at baseline     INFECTIOUS DISEASE:   no evidence of any underlying infectious process     HEMATOLOGICAL:  Pt refusing heparin for DVT PPX 2/2 recent GI procedure; SCD    ENDOCRINE  : Monitor FSG,   HbA1c 7.4  LDL 98  TSH 1.07    MUSCULOSKELETAL: Ambulate as tolerated     CODE STATUS: Full Code     DISPOSITION:  CCU

## 2022-05-02 NOTE — PROGRESS NOTE ADULT - SUBJECTIVE AND OBJECTIVE BOX
LENGTH OF HOSPITAL STAY: 3d    CHIEF COMPLAINT:    Patient is a 72y old  Female who presents with a chief complaint of CHB (01 May 2022 12:13)    OVERNIGHT EVENTS:    - pt still asymptomatic javier overnight  - pt examined at bedside, no new complaints, denies CP, SOB, n/v/d;  - spoke with family on speaker phone, questions answered, updated on plan    FOLLOW UP:    - cardiac MR, then PPM v AICD    HPI:    Patient is a 72 year old female with PMHx opf LBBB, HTN, Diabetes Mellitus Type 2, and mitral valve prolapse presents to the ED following an episode of bradycardia and chest discomfort. Patient was planned for colonoscopy today when her HS was found to be 35 - 45 bpm. Patient immediately  called her cardiologist Dr. Chino who recommenced patient report to the ED for evaluation. At patients last outpatient cardiology visit in 2022, ekg demonstrated baseline LBBB. Patient denies headache, blurred vision, syncope, dizziness, chest pain, shortness of breath, palpitations, diaphoresis, abdominal pain, or nausea/vomitting/diarrhea.      In the ED, patient initially hypertensive to 198/92 with HR of 45. Hypertension self resolved, now 133/55 with HR of 55. Labs revealing Mg 1.6 and BNP 1132. Troponin negative x1. EKG demonstrating complete heart block. Patient is afebrile and saturating well on room air. Patient currently asymptomatic. She was evaluated by the cardiology fellow, recommending admission to CCU for monitoring.       ALLERGIES:    No Known Allergies      PMHx:    Hypertension; Type 1 diabetes mellitus without complication; Mitral insufficiency; Family history of medical problems; cad, lbbb, hyperchoesteremia; Bilateral bunions;  delivery delivered; History of surgery tonsillectomy, hysterectomy, b/l eye surgery for muscle, knee miniscus sx right    SOCIAL Hx:    Family at bedside, multiple family members w/ PPM at young age    MEDICATIONS:  STANDING MEDICATIONS  amLODIPine   Tablet 5 milliGRAM(s) Oral daily  atorvastatin 40 milliGRAM(s) Oral at bedtime  chlorhexidine 4% Liquid 1 Application(s) Topical <User Schedule>  enalapril 20 milliGRAM(s) Oral two times a day  insulin lispro (ADMELOG) corrective regimen sliding scale   SubCutaneous three times a day before meals  insulin lispro (ADMELOG) corrective regimen sliding scale   SubCutaneous at bedtime  mesalamine DR Capsule 1200 milliGRAM(s) Oral every 8 hours    PRN MEDICATIONS  acetaminophen     Tablet .. 650 milliGRAM(s) Oral every 6 hours PRN      LABS:                        12.5   6.30  )-----------( 245      ( 02 May 2022 04:30 )             37.7     05-02    142  |  106  |  18  ----------------------------<  158<H>  3.6   |  25  |  0.6<L>    Ca    9.3      02 May 2022 04:30  Mg     1.7         TPro  6.1  /  Alb  4.1  /  TBili  0.6  /  DBili  x   /  AST  11  /  ALT  23  /  AlkPhos  51      PT/INR - ( 02 May 2022 11:00 )   PT: 12.30 sec;   INR: 1.07 ratio           CARDIAC MARKERS ( 01 May 2022 06:40 )  x     / <0.01 ng/mL / x     / x     / x            VITALS:   T(F): 97.2  HR: 42  BP: 153/69  RR: 20  SpO2: 94%        PHYSICAL EXAM:    Gen: NAD, resting in bed  HEENT: Normocephalic, atraumatic  Neck: supple, no lymphadenopathy  CV: javier rate & regular rhythm  Lungs: decreased BS at bases, no fremitus  Abdomen: Soft, BS present, nontender  Ext: Warm, well perfused, no edema  Neuro: non focal, awake, moves all extremities against resistance  Skin: no rash, no erythema

## 2022-05-02 NOTE — CHART NOTE - NSCHARTNOTEFT_GEN_A_CORE
Cardiology: Dr. Chino    72 year old female with PMH of LBBB, HTN, Diabetes Mellitus Type 2, and mitral valve prolapse presents to the ED following an episode of bradycardia and chest discomfort following a colonoscopy. Found to be in CHB 40s.     Lyme neg. EF74%. TSH 1.06.     Impression:  CHB 40s   Hx LBBB, HTN, DMII, Mitral valve prolapse  Hx severe diverticulosis (colonoscopy 4/29/22)    Plan:  - Pt will require PPM if no reversible cause found  - Hold all BB and CCB (bystolic PTA)  - Cardiac MRI completed, fu results  - OR tentatively tomorrow  - NPO after midnight  - Resend COVID in prep for tomorrow  - Resume diet today

## 2022-05-03 LAB
ALBUMIN SERPL ELPH-MCNC: 4.3 G/DL — SIGNIFICANT CHANGE UP (ref 3.5–5.2)
ALP SERPL-CCNC: 59 U/L — SIGNIFICANT CHANGE UP (ref 30–115)
ALT FLD-CCNC: 19 U/L — SIGNIFICANT CHANGE UP (ref 0–41)
ANION GAP SERPL CALC-SCNC: 12 MMOL/L — SIGNIFICANT CHANGE UP (ref 7–14)
AST SERPL-CCNC: 9 U/L — SIGNIFICANT CHANGE UP (ref 0–41)
BASOPHILS # BLD AUTO: 0.05 K/UL — SIGNIFICANT CHANGE UP (ref 0–0.2)
BASOPHILS NFR BLD AUTO: 0.7 % — SIGNIFICANT CHANGE UP (ref 0–1)
BILIRUB SERPL-MCNC: 1.3 MG/DL — HIGH (ref 0.2–1.2)
BUN SERPL-MCNC: 18 MG/DL — SIGNIFICANT CHANGE UP (ref 10–20)
CALCIUM SERPL-MCNC: 9.6 MG/DL — SIGNIFICANT CHANGE UP (ref 8.5–10.1)
CHLORIDE SERPL-SCNC: 104 MMOL/L — SIGNIFICANT CHANGE UP (ref 98–110)
CO2 SERPL-SCNC: 25 MMOL/L — SIGNIFICANT CHANGE UP (ref 17–32)
CREAT SERPL-MCNC: 0.7 MG/DL — SIGNIFICANT CHANGE UP (ref 0.7–1.5)
EGFR: 92 ML/MIN/1.73M2 — SIGNIFICANT CHANGE UP
EOSINOPHIL # BLD AUTO: 0.14 K/UL — SIGNIFICANT CHANGE UP (ref 0–0.7)
EOSINOPHIL NFR BLD AUTO: 1.8 % — SIGNIFICANT CHANGE UP (ref 0–8)
GLUCOSE BLDC GLUCOMTR-MCNC: 111 MG/DL — HIGH (ref 70–99)
GLUCOSE BLDC GLUCOMTR-MCNC: 119 MG/DL — HIGH (ref 70–99)
GLUCOSE BLDC GLUCOMTR-MCNC: 166 MG/DL — HIGH (ref 70–99)
GLUCOSE BLDC GLUCOMTR-MCNC: 193 MG/DL — HIGH (ref 70–99)
GLUCOSE BLDC GLUCOMTR-MCNC: 98 MG/DL — SIGNIFICANT CHANGE UP (ref 70–99)
GLUCOSE SERPL-MCNC: 179 MG/DL — HIGH (ref 70–99)
HCT VFR BLD CALC: 39.6 % — SIGNIFICANT CHANGE UP (ref 37–47)
HGB BLD-MCNC: 13.3 G/DL — SIGNIFICANT CHANGE UP (ref 12–16)
IMM GRANULOCYTES NFR BLD AUTO: 0.3 % — SIGNIFICANT CHANGE UP (ref 0.1–0.3)
LYMPHOCYTES # BLD AUTO: 1.57 K/UL — SIGNIFICANT CHANGE UP (ref 1.2–3.4)
LYMPHOCYTES # BLD AUTO: 20.4 % — LOW (ref 20.5–51.1)
MAGNESIUM SERPL-MCNC: 2 MG/DL — SIGNIFICANT CHANGE UP (ref 1.8–2.4)
MCHC RBC-ENTMCNC: 29.1 PG — SIGNIFICANT CHANGE UP (ref 27–31)
MCHC RBC-ENTMCNC: 33.6 G/DL — SIGNIFICANT CHANGE UP (ref 32–37)
MCV RBC AUTO: 86.7 FL — SIGNIFICANT CHANGE UP (ref 81–99)
MONOCYTES # BLD AUTO: 0.99 K/UL — HIGH (ref 0.1–0.6)
MONOCYTES NFR BLD AUTO: 12.9 % — HIGH (ref 1.7–9.3)
NEUTROPHILS # BLD AUTO: 4.91 K/UL — SIGNIFICANT CHANGE UP (ref 1.4–6.5)
NEUTROPHILS NFR BLD AUTO: 63.9 % — SIGNIFICANT CHANGE UP (ref 42.2–75.2)
NRBC # BLD: 0 /100 WBCS — SIGNIFICANT CHANGE UP (ref 0–0)
PLATELET # BLD AUTO: 245 K/UL — SIGNIFICANT CHANGE UP (ref 130–400)
POTASSIUM SERPL-MCNC: 4.5 MMOL/L — SIGNIFICANT CHANGE UP (ref 3.5–5)
POTASSIUM SERPL-SCNC: 4.5 MMOL/L — SIGNIFICANT CHANGE UP (ref 3.5–5)
PROT SERPL-MCNC: 6.6 G/DL — SIGNIFICANT CHANGE UP (ref 6–8)
RBC # BLD: 4.57 M/UL — SIGNIFICANT CHANGE UP (ref 4.2–5.4)
RBC # FLD: 14.6 % — HIGH (ref 11.5–14.5)
SODIUM SERPL-SCNC: 141 MMOL/L — SIGNIFICANT CHANGE UP (ref 135–146)
WBC # BLD: 7.68 K/UL — SIGNIFICANT CHANGE UP (ref 4.8–10.8)
WBC # FLD AUTO: 7.68 K/UL — SIGNIFICANT CHANGE UP (ref 4.8–10.8)

## 2022-05-03 PROCEDURE — 33249 INSJ/RPLCMT DEFIB W/LEAD(S): CPT

## 2022-05-03 PROCEDURE — 71045 X-RAY EXAM CHEST 1 VIEW: CPT | Mod: 26

## 2022-05-03 PROCEDURE — 93010 ELECTROCARDIOGRAM REPORT: CPT

## 2022-05-03 PROCEDURE — 99233 SBSQ HOSP IP/OBS HIGH 50: CPT

## 2022-05-03 RX ORDER — ONDANSETRON 8 MG/1
4 TABLET, FILM COATED ORAL ONCE
Refills: 0 | Status: DISCONTINUED | OUTPATIENT
Start: 2022-05-03 | End: 2022-05-04

## 2022-05-03 RX ORDER — VANCOMYCIN HCL 1 G
1000 VIAL (EA) INTRAVENOUS ONCE
Refills: 0 | Status: COMPLETED | OUTPATIENT
Start: 2022-05-03 | End: 2022-05-03

## 2022-05-03 RX ORDER — MAGNESIUM SULFATE 500 MG/ML
2 VIAL (ML) INJECTION ONCE
Refills: 0 | Status: COMPLETED | OUTPATIENT
Start: 2022-05-03 | End: 2022-05-03

## 2022-05-03 RX ORDER — VANCOMYCIN HCL 1 G
750 VIAL (EA) INTRAVENOUS EVERY 12 HOURS
Refills: 0 | Status: COMPLETED | OUTPATIENT
Start: 2022-05-03 | End: 2022-05-04

## 2022-05-03 RX ORDER — SODIUM CHLORIDE 9 MG/ML
1000 INJECTION, SOLUTION INTRAVENOUS
Refills: 0 | Status: DISCONTINUED | OUTPATIENT
Start: 2022-05-03 | End: 2022-05-04

## 2022-05-03 RX ORDER — MORPHINE SULFATE 50 MG/1
2 CAPSULE, EXTENDED RELEASE ORAL
Refills: 0 | Status: DISCONTINUED | OUTPATIENT
Start: 2022-05-03 | End: 2022-05-04

## 2022-05-03 RX ORDER — ACETAMINOPHEN 500 MG
650 TABLET ORAL EVERY 6 HOURS
Refills: 0 | Status: DISCONTINUED | OUTPATIENT
Start: 2022-05-03 | End: 2022-05-03

## 2022-05-03 RX ORDER — OXYCODONE AND ACETAMINOPHEN 5; 325 MG/1; MG/1
1 TABLET ORAL ONCE
Refills: 0 | Status: DISCONTINUED | OUTPATIENT
Start: 2022-05-03 | End: 2022-05-03

## 2022-05-03 RX ADMIN — CHLORHEXIDINE GLUCONATE 1 APPLICATION(S): 213 SOLUTION TOPICAL at 05:19

## 2022-05-03 RX ADMIN — Medication 1200 MILLIGRAM(S): at 05:19

## 2022-05-03 RX ADMIN — Medication 1200 MILLIGRAM(S): at 21:38

## 2022-05-03 RX ADMIN — Medication 20 MILLIGRAM(S): at 17:38

## 2022-05-03 RX ADMIN — OXYCODONE AND ACETAMINOPHEN 1 TABLET(S): 5; 325 TABLET ORAL at 18:03

## 2022-05-03 RX ADMIN — Medication 25 GRAM(S): at 06:26

## 2022-05-03 RX ADMIN — AMLODIPINE BESYLATE 5 MILLIGRAM(S): 2.5 TABLET ORAL at 11:41

## 2022-05-03 RX ADMIN — ATORVASTATIN CALCIUM 40 MILLIGRAM(S): 80 TABLET, FILM COATED ORAL at 21:38

## 2022-05-03 RX ADMIN — OXYCODONE AND ACETAMINOPHEN 1 TABLET(S): 5; 325 TABLET ORAL at 17:39

## 2022-05-03 RX ADMIN — Medication 250 MILLIGRAM(S): at 17:41

## 2022-05-03 RX ADMIN — Medication 250 MILLIGRAM(S): at 23:13

## 2022-05-03 RX ADMIN — Medication 20 MILLIGRAM(S): at 05:19

## 2022-05-03 NOTE — PROGRESS NOTE ADULT - ASSESSMENT
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 Aspiration precautions     PULMONARY:   HOB @ 45. Monitor Pulse Ox. Keep > 93%. Supplement as needed.    CARDIOVASCULAR:   currently HD stable and asymptomatic  CCU monitoring   cont Norvasc and Enalapril    Avoid AVNRT  MR heart r/o infiltrative disease  For PPM w/ EP after    GI:   feeding     RENAL:   Avoid nephrotoxic agents, Cr at baseline     INFECTIOUS DISEASE:   no evidence of any underlying infectious process     HEMATOLOGICAL:  Pt refusing heparin for DVT PPX 2/2 recent GI procedure; SCD    ENDOCRINE  : Monitor FSG,   HbA1c 7.4  LDL 98  TSH 1.07    MUSCULOSKELETAL: Ambulate as tolerated     CODE STATUS: Full Code     DISPOSITION:  CCU    Sinus rhythm with Complete Heart Block - hemodynamically stable   Diabetes Mellitus Type 2  HTN  3.0cm mass-like liver structure    PLAN:    CNS:   Avoid CNS Depressants     HEENT:   Oral care. Aspiration precautions     PULMONARY:   HOB @ 45. Monitor Pulse Ox. Keep > 93%. Supplement as needed.    CARDIOVASCULAR:   currently HD stable and asymptomatic  CCU monitoring   cont Norvasc and Enalapril    Avoid AVNRT  MR heart shows delayed gadolinium enhancement suggestive of  infiltrative disease  Scheduled for AICD today    GI:   feeding   MR shows incidental 3.0cm liver mass    RENAL:   Avoid nephrotoxic agents, Cr at baseline     INFECTIOUS DISEASE:   no evidence of any underlying infectious process     HEMATOLOGICAL:  Pt refusing heparin for DVT PPX 2/2 recent GI procedure; SCD    ENDOCRINE  : Monitor FSG,   HbA1c 7.4  LDL 98  TSH 1.07    MUSCULOSKELETAL: Ambulate as tolerated     CODE STATUS: Full Code     DISPOSITION:  CCU

## 2022-05-03 NOTE — PROGRESS NOTE ADULT - SUBJECTIVE AND OBJECTIVE BOX
PATIENT:  GALILEA BROOKE  [id number]    CHIEF COMPLAINT:  Patient is a 72y old  Female who presents with a chief complaint of INCOMPLETE NOTE (02 May 2022 12:54)      INTERVAL HISTORY/OVERNIGHT EVENTS:      REVIEW OF SYSTEMS:    Constitutional:     [ ] negative [ ] fevers [ ] chills [ ] weight loss [ ] weight gain  HEENT:                  [ ] negative [ ] dry eyes [ ] eye irritation [ ] postnasal drip [ ] nasal congestion  CV:                         [ ] negative  [ ] chest pain [ ] orthopnea [ ] palpitations [ ] murmur  Resp:                     [ ] negative [ ] cough [ ] shortness of breath [ ] dyspnea [ ] wheezing [ ] sputum [ ] hemoptysis  GI:                          [ ] negative [ ] nausea [ ] vomiting [ ] diarrhea [ ] constipation [ ] abd pain [ ] dysphagia   :                        [ ] negative [ ] dysuria [ ] nocturia [ ] hematuria [ ] increased urinary frequency  Musculoskeletal: [ ] negative [ ] back pain [ ] myalgias [ ] arthralgias [ ] fracture  Skin:                       [ ] negative [ ] rash [ ] itch  Neurological:        [ ] negative [ ] headache [ ] dizziness [ ] syncope [ ] weakness [ ] numbness  Psychiatric:           [ ] negative [ ] anxiety [ ] depression  Endocrine:            [ ] negative [ ] diabetes [ ] thyroid problem  Heme/Lymph:      [ ] negative [ ] anemia [ ] bleeding problem  Allergic/Immune: [ ] negative [ ] itchy eyes [ ] nasal discharge [ ] hives [ ] angioedema    [x] All other systems negative  [ ] Unable to assess ROS because ________.    MEDICATIONS:  MEDICATIONS  (STANDING):  amLODIPine   Tablet 5 milliGRAM(s) Oral daily  atorvastatin 40 milliGRAM(s) Oral at bedtime  chlorhexidine 4% Liquid 1 Application(s) Topical <User Schedule>  enalapril 20 milliGRAM(s) Oral two times a day  insulin lispro (ADMELOG) corrective regimen sliding scale   SubCutaneous three times a day before meals  insulin lispro (ADMELOG) corrective regimen sliding scale   SubCutaneous at bedtime  mesalamine DR Capsule 1200 milliGRAM(s) Oral every 8 hours    MEDICATIONS  (PRN):  acetaminophen     Tablet .. 650 milliGRAM(s) Oral every 6 hours PRN Moderate Pain (4 - 6)      ALLERGIES:  Allergies    No Known Allergies    Intolerances        OBJECTIVE:  ICU Vital Signs Last 24 Hrs  T(C): 36.1 (03 May 2022 04:00), Max: 36.4 (02 May 2022 08:00)  T(F): 96.9 (03 May 2022 04:00), Max: 97.5 (02 May 2022 08:00)  HR: 41 (03 May 2022 06:00) (37 - 47)  BP: 122/56 (03 May 2022 06:00) (118/55 - 166/88)  BP(mean): 81 (03 May 2022 06:00) (79 - 118)  ABP: --  ABP(mean): --  RR: 22 (03 May 2022 06:00) (17 - 46)  SpO2: 98% (03 May 2022 06:00) (94% - 99%)      Adult Advanced Hemodynamics Last 24 Hrs  CVP(mm Hg): --  CVP(cm H2O): --  CO: --  CI: --  PA: --  PA(mean): --  PCWP: --  SVR: --  SVRI: --  PVR: --  PVRI: --  CAPILLARY BLOOD GLUCOSE      POCT Blood Glucose.: 193 mg/dL (02 May 2022 21:07)  POCT Blood Glucose.: 204 mg/dL (02 May 2022 17:23)  POCT Blood Glucose.: 125 mg/dL (02 May 2022 12:29)  POCT Blood Glucose.: 142 mg/dL (02 May 2022 09:09)    CAPILLARY BLOOD GLUCOSE      POCT Blood Glucose.: 193 mg/dL (02 May 2022 21:07)    I&O's Summary    01 May 2022 07:01  -  02 May 2022 07:00  --------------------------------------------------------  IN: 1320 mL / OUT: 10 mL / NET: 1310 mL    02 May 2022 07:01  -  03 May 2022 06:58  --------------------------------------------------------  IN: 660 mL / OUT: 1000 mL / NET: -340 mL      Daily     Daily Weight in k (03 May 2022 04:00)    PHYSICAL EXAMINATION:  General: WN/WD NAD  HEENT: PERRLA, EOMI, moist mucous membranes  Neurology: A&Ox3, nonfocal, ZAZUETA x 4  Respiratory: CTA B/L, normal respiratory effort, no wheezes, crackles, rales  CV: RRR, S1S2, no murmurs, rubs or gallops  Abdominal: Soft, NT, ND +BS, Last BM  Extremities: No edema, + peripheral pulses  Incisions:   Tubes:    LABS:                          13.3   7.68  )-----------( 245      ( 03 May 2022 05:00 )             39.6     05-03    141  |  104  |  18  ----------------------------<  179<H>  4.5   |  25  |  0.7    Ca    9.6      03 May 2022 05:00  Mg     2.0     05-03    TPro  6.6  /  Alb  4.3  /  TBili  1.3<H>  /  DBili  x   /  AST  9   /  ALT  19  /  AlkPhos  59  05-03    LIVER FUNCTIONS - ( 03 May 2022 05:00 )  Alb: 4.3 g/dL / Pro: 6.6 g/dL / ALK PHOS: 59 U/L / ALT: 19 U/L / AST: 9 U/L / GGT: x           PT/INR - ( 02 May 2022 11:00 )   PT: 12.30 sec;   INR: 1.07 ratio                     TELEMETRY:     EK Lead ECG:   Ventricular Rate 39 BPM    Atrial Rate 67 BPM    QRS Duration 128 ms    Q-T Interval 550 ms    QTC Calculation(Bazett) 442 ms    P Axis -6 degrees    R Axis 34 degrees    T Axis -49 degrees    Diagnosis Line Sinus rhythm with complete heart block and Idioventricular rhythm with  occasional Premature ventricular complexes  Right bundle branch block  T wave abnormality, consider inferolateral ischemia  Abnormal ECG    Confirmed by FERN MASON MD ([id number]) on 2022 6:26:39 AM (22 @ 05:21)           PATIENT:  GALILEA BROOKE  [id number]    CHIEF COMPLAINT:  Patient is a 72y old  Female who presents with a chief complaint of INCOMPLETE NOTE (02 May 2022 12:54)      INTERVAL HISTORY/OVERNIGHT EVENTS:    Pt seen and examined at bedside. Pt is sitting comfortable in bed has no complaints    MEDICATIONS:  MEDICATIONS  (STANDING):  amLODIPine   Tablet 5 milliGRAM(s) Oral daily  atorvastatin 40 milliGRAM(s) Oral at bedtime  chlorhexidine 4% Liquid 1 Application(s) Topical <User Schedule>  enalapril 20 milliGRAM(s) Oral two times a day  insulin lispro (ADMELOG) corrective regimen sliding scale   SubCutaneous three times a day before meals  insulin lispro (ADMELOG) corrective regimen sliding scale   SubCutaneous at bedtime  mesalamine DR Capsule 1200 milliGRAM(s) Oral every 8 hours    MEDICATIONS  (PRN):  acetaminophen     Tablet .. 650 milliGRAM(s) Oral every 6 hours PRN Moderate Pain (4 - 6)      ALLERGIES:  Allergies    No Known Allergies    Intolerances        OBJECTIVE:  ICU Vital Signs Last 24 Hrs  T(C): 36.1 (03 May 2022 04:00), Max: 36.4 (02 May 2022 08:00)  T(F): 96.9 (03 May 2022 04:00), Max: 97.5 (02 May 2022 08:00)  HR: 41 (03 May 2022 06:00) (37 - 47)  BP: 122/56 (03 May 2022 06:00) (118/55 - 166/88)  BP(mean): 81 (03 May 2022 06:00) (79 - 118)  ABP: --  ABP(mean): --  RR: 22 (03 May 2022 06:00) (17 - 46)  SpO2: 98% (03 May 2022 06:00) (94% - 99%)      Adult Advanced Hemodynamics Last 24 Hrs  CVP(mm Hg): --  CVP(cm H2O): --  CO: --  CI: --  PA: --  PA(mean): --  PCWP: --  SVR: --  SVRI: --  PVR: --  PVRI: --  CAPILLARY BLOOD GLUCOSE      POCT Blood Glucose.: 193 mg/dL (02 May 2022 21:07)  POCT Blood Glucose.: 204 mg/dL (02 May 2022 17:23)  POCT Blood Glucose.: 125 mg/dL (02 May 2022 12:29)  POCT Blood Glucose.: 142 mg/dL (02 May 2022 09:09)    CAPILLARY BLOOD GLUCOSE      POCT Blood Glucose.: 193 mg/dL (02 May 2022 21:07)    I&O's Summary    01 May 2022 07:01  -  02 May 2022 07:00  --------------------------------------------------------  IN: 1320 mL / OUT: 10 mL / NET: 1310 mL    02 May 2022 07:01  -  03 May 2022 06:58  --------------------------------------------------------  IN: 660 mL / OUT: 1000 mL / NET: -340 mL      Daily     Daily Weight in k (03 May 2022 04:00)    PHYSICAL EXAMINATION:  General: WN/WD NAD  HEENT: PERRLA, EOMI, moist mucous membranes  Neurology: A&Ox3, nonfocal, ZAZUETA x 4  Respiratory: CTA B/L, normal respiratory effort, no wheezes, crackles, rales  CV: bradycardic, regular rhythm, S1S2, no murmurs, rubs or gallops  Abdominal: Soft, NT, ND +BS, Last BM  Extremities: No edema, + peripheral pulses  Incisions:   Tubes:    LABS:                          13.3   7.68  )-----------( 245      ( 03 May 2022 05:00 )             39.6     05-03    141  |  104  |  18  ----------------------------<  179<H>  4.5   |  25  |  0.7    Ca    9.6      03 May 2022 05:00  Mg     2.0     0503    TPro  6.6  /  Alb  4.3  /  TBili  1.3<H>  /  DBili  x   /  AST  9   /  ALT  19  /  AlkPhos  59  05-03    LIVER FUNCTIONS - ( 03 May 2022 05:00 )  Alb: 4.3 g/dL / Pro: 6.6 g/dL / ALK PHOS: 59 U/L / ALT: 19 U/L / AST: 9 U/L / GGT: x           PT/INR - ( 02 May 2022 11:00 )   PT: 12.30 sec;   INR: 1.07 ratio                     TELEMETRY:     EK Lead ECG:   Ventricular Rate 39 BPM    Atrial Rate 67 BPM    QRS Duration 128 ms    Q-T Interval 550 ms    QTC Calculation(Bazett) 442 ms    P Axis -6 degrees    R Axis 34 degrees    T Axis -49 degrees    Diagnosis Line Sinus rhythm with complete heart block and Idioventricular rhythm with  occasional Premature ventricular complexes  Right bundle branch block  T wave abnormality, consider inferolateral ischemia  Abnormal ECG    Confirmed by FERN MASON MD ([id number]) on 2022 6:26:39 AM (22 @ 05:21)

## 2022-05-03 NOTE — CHART NOTE - NSCHARTNOTEFT_GEN_A_CORE
ELECTROPHYSIOLOGY PROCEDURE: Dual Chamber Defibrillator Implantation  IMPRESSION:  Successful dual chamber defibrillator implant ([employer], Non-dependent).    PLAN:   - Overnight telemetry monitoring  - CXR and Interrogation in am  - Anticipate DC in am if stable.    : Sanket Rosenthal M.D.  INDICATION FOR PROCEDURE: Complete heart block. MRI suggestive of sarcoidosis    CONSENT:   A thorough discussion was had with the patient and his family concerning all aspects of ICD therapy. We reviewed the data supporting ICD therapy and how it applies individually.  We discussed the procedures, risks and outcomes of ICD implantation an living with an ICD. We discussed management of ICD therapy throughout life, including deactivation of the ICD. After all questions were answered, it was a shared decision to proceed with ICD therapy.    SEDATION:   The patient was transported to the Electrophysiology Laboratory in a non-sedated state and was placed supine on the fluoroscopy table. Sedation was provided by anesthesia team. The patient underwent continuous blood pressure, heart rate and O2 saturation monitoring throughout the procedure with supplemental oxygen utilized as needed.     DETAILS OF PROCEDURE:  Informed consent was obtained, and the patient was brought to the EP lab in a fasting state. The patient  was prepped and draped in the usual strict sterile fashion.     After the antibiotic was completely infused, 60 cc lidocaine was infiltrated into the area just medial to the left deltopectoral groove. Using a #10 scalpel, an incision was made. The incision was extended to the prepectoral fascia using blunt dissection. Then left extra-thoracic axillary vein was accessed twice under fluoroscopy and venogram (10 cc). Guidewires were placed into the vein. Then the sheaths were placed over the guidewire.     Then the ventricular lead was advanced into the RV. The RV lead was fixated to the right ventricular low septum. Appropriate sensing and thresholds were obtained. No diaphragmatic pacing occurred at 10 V and 1.5 ms.    Then the atrial lead was advanced into the RA. The RA lead was fixated to the right atrial appendage. Appropriate sensing and thresholds were obtained. No diaphragmatic pacing occurred at 10 V and 1.5 ms.    The sheaths were removed. The leads were then sutured to the pectoralis muscle using Ethibond. Leads measurements were then rechecked.    Then the left prepectoral pocket was fashioned. The leads were attached to the generator. The system was placed in the pocket and connected to the lead. The generator was sutured to the pocket. The generator and leads system were visualized under fluoroscopy. Appropriate redundancy/slack in the leads were noted. The pins of the leads were beyond the set screws.    Hemostasis was reverified. The pocket was then closed in two layers. Dermabond and a sterile dressing were placed.     The patient was transferred to the pre-post room in stable condition.    EBL: 5 cc  Complication: None    EQUIPMENT IMPLANTED AND BASELINE PARAMETERS  Pulse Generator   : [employer]	  Model Number: 	Resonate EL ICD  Serial Number: [id number]       Atrial Lead:  Model Number:	Ingevity+ 45  Serial Number:	[id number]  Threshold:	0.5 V at 0.4 ms  Sensin.9 mV  Impedance:	609 Ohms  						  Right Ventricular Defibrillator Lead:  Model Number:	[place] 4-Front S 59 cm  Serial Number:	[id number]  Threshold:	0.4 V at 0.4 ms  Sensin.3 mV  Impedance:	478 Ohms (HV Impedance: 73 Ohms)        IMPRESSION:  Successful dual chamber defibrillator implant ([employer], Non-dependent). ELECTROPHYSIOLOGY PROCEDURE: Dual Chamber Defibrillator Implantation  IMPRESSION:  Successful dual chamber defibrillator implant ([employer], Dependent).    PLAN:   - Overnight telemetry monitoring  - CXR and Interrogation in am  - Anticipate DC in am if stable.    : Sanket Rosenthal M.D.  INDICATION FOR PROCEDURE: Complete heart block. MRI suggestive of sarcoidosis    CONSENT:   A thorough discussion was had with the patient and his family concerning all aspects of ICD therapy. We reviewed the data supporting ICD therapy and how it applies individually.  We discussed the procedures, risks and outcomes of ICD implantation an living with an ICD. We discussed management of ICD therapy throughout life, including deactivation of the ICD. After all questions were answered, it was a shared decision to proceed with ICD therapy.    SEDATION:   The patient was transported to the Electrophysiology Laboratory in a non-sedated state and was placed supine on the fluoroscopy table. Sedation was provided by anesthesia team. The patient underwent continuous blood pressure, heart rate and O2 saturation monitoring throughout the procedure with supplemental oxygen utilized as needed.     DETAILS OF PROCEDURE:  Informed consent was obtained, and the patient was brought to the EP lab in a fasting state. The patient  was prepped and draped in the usual strict sterile fashion.     After the antibiotic was completely infused, 60 cc lidocaine was infiltrated into the area just medial to the left deltopectoral groove. Using a #10 scalpel, an incision was made. The incision was extended to the prepectoral fascia using blunt dissection. Then left extra-thoracic axillary vein was accessed twice under fluoroscopy and venogram (10 cc). Guidewires were placed into the vein. Then the sheaths were placed over the guidewire.     Then the ventricular lead was advanced into the RV. The RV lead was fixated to the right ventricular low septum. Appropriate sensing and thresholds were obtained. No diaphragmatic pacing occurred at 10 V and 1.5 ms.    Then the atrial lead was advanced into the RA. The RA lead was fixated to the right atrial appendage. Appropriate sensing and thresholds were obtained. No diaphragmatic pacing occurred at 10 V and 1.5 ms.    The sheaths were removed. The leads were then sutured to the pectoralis muscle using Ethibond. Leads measurements were then rechecked.    Then the left prepectoral pocket was fashioned. The leads were attached to the generator. The system was placed in the pocket and connected to the lead. The generator was sutured to the pocket. The generator and leads system were visualized under fluoroscopy. Appropriate redundancy/slack in the leads were noted. The pins of the leads were beyond the set screws.    Hemostasis was reverified. The pocket was then closed in two layers. Dermabond and a sterile dressing were placed.     The patient was transferred to the pre-post room in stable condition.    EBL: 5 cc  Complication: None    EQUIPMENT IMPLANTED AND BASELINE PARAMETERS  Pulse Generator   : [employer]	  Model Number: 	Resonate EL ICD  Serial Number: [id number]       Atrial Lead:  Model Number:	Ingevity+ 45  Serial Number:	[id number]  Threshold:	0.5 V at 0.4 ms  Sensin.9 mV  Impedance:	609 Ohms  						  Right Ventricular Defibrillator Lead:  Model Number:	[employer] 4-Front S 59 cm  Serial Number:	[id number]  Threshold:	0.4 V at 0.4 ms  Sensin.3 mV  Impedance:	478 Ohms (HV Impedance: 73 Ohms)        IMPRESSION:  Successful dual chamber defibrillator implant ([employer], Dependent).

## 2022-05-03 NOTE — PROGRESS NOTE ADULT - ATTENDING COMMENTS
Seen / examined and above reviewed.    Baseline LBBB    Stable.  MRI demonstrated possible infiltrative disease.  Plan for AICD.    Incidental liver mass on MRI.  Needs GI follow-up.    - AICD as planned.  - Cont Norvasc and Enalapril.

## 2022-05-03 NOTE — CHART NOTE - NSCHARTNOTEFT_GEN_A_CORE
CCU Transfer Note    Transfer from: CCU   Transfer to:  (  ) Medicine    (x) Telemetry    (  ) RCU    (  ) Palliative    (  ) Stroke Unit    (  ) _______________      CCU COURSE:  72 year old female with PMHx opf LBBB, HTN, Diabetes Mellitus Type 2, and mitral valve prolapse presented to the ED following an episode of bradycardia and chest discomfort. Patient was planned for colonoscopy 4/29/22 when her HR was found to be 35-45 bpm. Patient immediately called her cardiologist Dr. Chino who recommended patient report to the ED for evaluation. At patient's last outpatient cardiology visit in Feb 2022, EKG demonstrated baseline LBBB. Patient denied headache, blurred vision, syncope, dizziness, chest pain, shortness of breath, palpitations, diaphoresis, abdominal pain, or nausea/vomitting/diarrhea.      In the ED, patient initially hypertensive to 198/92 with HR of 45. Hypertension self resolved. Troponin negative x1. EKG demonstrating complete heart block. Patient currently asymptomatic. She was admitted to CCU for monitoring and plan with EP for pacemaker pending MR heart.     While in CCU pt remained asymptomatic. MR heart showed "mid myocardial delayed enhancement in the basal lateral wall of the left ventricle which may be seen with myocarditis with differential diagnosis of infiltrative disease such as sarcoidosis." Defibrillator placed 5/3/22.    Of note, MRI also showed "3.0 cm structure within the liver incompletely characterized."      ASSESSMENT & PLAN:     Sinus rhythm with Complete Heart Block - hemodynamically stable   Diabetes Mellitus Type 2  HTN  3.0cm mass-like liver structure    PLAN:    CNS:   Avoid CNS Depressants     HEENT:   Oral care. Aspiration precautions     PULMONARY:  HOB @ 45. Monitor Pulse Ox. Keep > 93%. Supplement as needed.    CARDIOVASCULAR:   currently HD stable and asymptomatic  CCU monitoring   cont Norvasc and Enalapril    Avoid AVNRT  MR heart shows delayed gadolinium enhancement suggestive of  infiltrative disease  Scheduled for AICD today    GI:   feeding   MR shows incidental 3.0cm liver mass; further imaging recommended    RENAL:  Avoid nephrotoxic agents, Cr at baseline     INFECTIOUS DISEASE:  No evidence of any underlying infectious process     HEMATOLOGICAL:  Pt refusing heparin for DVT PPX 2/2 recent GI procedure; SCD    ENDOCRINE  Monitor FSG,   HbA1c 7.4  LDL 98  TSH 1.07    MUSCULOSKELETAL: Ambulate as tolerated     CODE STATUS: Full Code

## 2022-05-03 NOTE — CHART NOTE - NSCHARTNOTEFT_GEN_A_CORE
PACU ANESTHESIA ADMISSION NOTE      Procedure:   Post op diagnosis:      ____  Intubated  TV:______       Rate: ______      FiO2: ______    _x___  Patent Airway    _x___  Full return of protective reflexes    _x___  Full recovery from anesthesia / back to baseline status    Vitals:    See anesthesia record      Mental Status:  _x___ Awake   _____ Alert   _____ Drowsy   _____ Sedated    Nausea/Vomiting:  _x___  NO       ______Yes,   See Post - Op Orders         Pain Scale (0-10):  __0___    Treatment: _x___ None    ____ See Post - Op/PCA Orders    Post - Operative Fluids:   __x__ Oral   ____ See Post - Op Orders    Plan: Discharge:   ____Home       _____Floor     __x___Critical Care    _____  Other:_________________    Comments:  No anesthesia issues or complications noted.  Discharge when criteria met.

## 2022-05-04 ENCOUNTER — TRANSCRIPTION ENCOUNTER (OUTPATIENT)
Age: 72
End: 2022-05-04

## 2022-05-04 VITALS — RESPIRATION RATE: 20 BRPM | DIASTOLIC BLOOD PRESSURE: 85 MMHG | HEART RATE: 91 BPM | SYSTOLIC BLOOD PRESSURE: 145 MMHG

## 2022-05-04 PROBLEM — Z00.00 ENCOUNTER FOR PREVENTIVE HEALTH EXAMINATION: Status: ACTIVE | Noted: 2022-05-04

## 2022-05-04 LAB
ALBUMIN SERPL ELPH-MCNC: 4.1 G/DL — SIGNIFICANT CHANGE UP (ref 3.5–5.2)
ALBUMIN SERPL ELPH-MCNC: 4.4 G/DL — SIGNIFICANT CHANGE UP (ref 3.5–5.2)
ALP SERPL-CCNC: 58 U/L — SIGNIFICANT CHANGE UP (ref 30–115)
ALP SERPL-CCNC: 63 U/L — SIGNIFICANT CHANGE UP (ref 30–115)
ALT FLD-CCNC: 14 U/L — SIGNIFICANT CHANGE UP (ref 0–41)
ALT FLD-CCNC: 15 U/L — SIGNIFICANT CHANGE UP (ref 0–41)
ANION GAP SERPL CALC-SCNC: 13 MMOL/L — SIGNIFICANT CHANGE UP (ref 7–14)
ANION GAP SERPL CALC-SCNC: 14 MMOL/L — SIGNIFICANT CHANGE UP (ref 7–14)
AST SERPL-CCNC: 11 U/L — SIGNIFICANT CHANGE UP (ref 0–41)
AST SERPL-CCNC: 11 U/L — SIGNIFICANT CHANGE UP (ref 0–41)
BASOPHILS # BLD AUTO: 0.03 K/UL — SIGNIFICANT CHANGE UP (ref 0–0.2)
BASOPHILS NFR BLD AUTO: 0.4 % — SIGNIFICANT CHANGE UP (ref 0–1)
BILIRUB SERPL-MCNC: 1.3 MG/DL — HIGH (ref 0.2–1.2)
BILIRUB SERPL-MCNC: 1.4 MG/DL — HIGH (ref 0.2–1.2)
BUN SERPL-MCNC: 10 MG/DL — SIGNIFICANT CHANGE UP (ref 10–20)
BUN SERPL-MCNC: 11 MG/DL — SIGNIFICANT CHANGE UP (ref 10–20)
CALCIUM SERPL-MCNC: 9 MG/DL — SIGNIFICANT CHANGE UP (ref 8.5–10.1)
CALCIUM SERPL-MCNC: 9.8 MG/DL — SIGNIFICANT CHANGE UP (ref 8.5–10.1)
CHLORIDE SERPL-SCNC: 102 MMOL/L — SIGNIFICANT CHANGE UP (ref 98–110)
CHLORIDE SERPL-SCNC: 103 MMOL/L — SIGNIFICANT CHANGE UP (ref 98–110)
CO2 SERPL-SCNC: 23 MMOL/L — SIGNIFICANT CHANGE UP (ref 17–32)
CO2 SERPL-SCNC: 25 MMOL/L — SIGNIFICANT CHANGE UP (ref 17–32)
CREAT SERPL-MCNC: 0.5 MG/DL — LOW (ref 0.7–1.5)
CREAT SERPL-MCNC: 0.6 MG/DL — LOW (ref 0.7–1.5)
EGFR: 100 ML/MIN/1.73M2 — SIGNIFICANT CHANGE UP
EGFR: 96 ML/MIN/1.73M2 — SIGNIFICANT CHANGE UP
EOSINOPHIL # BLD AUTO: 0.11 K/UL — SIGNIFICANT CHANGE UP (ref 0–0.7)
EOSINOPHIL NFR BLD AUTO: 1.6 % — SIGNIFICANT CHANGE UP (ref 0–8)
GLUCOSE BLDC GLUCOMTR-MCNC: 150 MG/DL — HIGH (ref 70–99)
GLUCOSE BLDC GLUCOMTR-MCNC: 162 MG/DL — HIGH (ref 70–99)
GLUCOSE SERPL-MCNC: 141 MG/DL — HIGH (ref 70–99)
GLUCOSE SERPL-MCNC: 189 MG/DL — HIGH (ref 70–99)
HCT VFR BLD CALC: 39 % — SIGNIFICANT CHANGE UP (ref 37–47)
HGB BLD-MCNC: 12.9 G/DL — SIGNIFICANT CHANGE UP (ref 12–16)
IMM GRANULOCYTES NFR BLD AUTO: 0.3 % — SIGNIFICANT CHANGE UP (ref 0.1–0.3)
LYMPHOCYTES # BLD AUTO: 1.2 K/UL — SIGNIFICANT CHANGE UP (ref 1.2–3.4)
LYMPHOCYTES # BLD AUTO: 17.9 % — LOW (ref 20.5–51.1)
MAGNESIUM SERPL-MCNC: 1.7 MG/DL — LOW (ref 1.8–2.4)
MAGNESIUM SERPL-MCNC: 1.7 MG/DL — LOW (ref 1.8–2.4)
MCHC RBC-ENTMCNC: 28.9 PG — SIGNIFICANT CHANGE UP (ref 27–31)
MCHC RBC-ENTMCNC: 33.1 G/DL — SIGNIFICANT CHANGE UP (ref 32–37)
MCV RBC AUTO: 87.2 FL — SIGNIFICANT CHANGE UP (ref 81–99)
MONOCYTES # BLD AUTO: 0.92 K/UL — HIGH (ref 0.1–0.6)
MONOCYTES NFR BLD AUTO: 13.7 % — HIGH (ref 1.7–9.3)
NEUTROPHILS # BLD AUTO: 4.43 K/UL — SIGNIFICANT CHANGE UP (ref 1.4–6.5)
NEUTROPHILS NFR BLD AUTO: 66.1 % — SIGNIFICANT CHANGE UP (ref 42.2–75.2)
NRBC # BLD: 0 /100 WBCS — SIGNIFICANT CHANGE UP (ref 0–0)
PLATELET # BLD AUTO: 215 K/UL — SIGNIFICANT CHANGE UP (ref 130–400)
POTASSIUM SERPL-MCNC: 4 MMOL/L — SIGNIFICANT CHANGE UP (ref 3.5–5)
POTASSIUM SERPL-MCNC: 4.3 MMOL/L — SIGNIFICANT CHANGE UP (ref 3.5–5)
POTASSIUM SERPL-SCNC: 4 MMOL/L — SIGNIFICANT CHANGE UP (ref 3.5–5)
POTASSIUM SERPL-SCNC: 4.3 MMOL/L — SIGNIFICANT CHANGE UP (ref 3.5–5)
PROT SERPL-MCNC: 6.4 G/DL — SIGNIFICANT CHANGE UP (ref 6–8)
PROT SERPL-MCNC: 6.9 G/DL — SIGNIFICANT CHANGE UP (ref 6–8)
RBC # BLD: 4.47 M/UL — SIGNIFICANT CHANGE UP (ref 4.2–5.4)
RBC # FLD: 14.6 % — HIGH (ref 11.5–14.5)
SODIUM SERPL-SCNC: 140 MMOL/L — SIGNIFICANT CHANGE UP (ref 135–146)
SODIUM SERPL-SCNC: 140 MMOL/L — SIGNIFICANT CHANGE UP (ref 135–146)
WBC # BLD: 6.71 K/UL — SIGNIFICANT CHANGE UP (ref 4.8–10.8)
WBC # FLD AUTO: 6.71 K/UL — SIGNIFICANT CHANGE UP (ref 4.8–10.8)

## 2022-05-04 PROCEDURE — 99233 SBSQ HOSP IP/OBS HIGH 50: CPT

## 2022-05-04 PROCEDURE — 71046 X-RAY EXAM CHEST 2 VIEWS: CPT | Mod: 26

## 2022-05-04 PROCEDURE — 99024 POSTOP FOLLOW-UP VISIT: CPT

## 2022-05-04 PROCEDURE — 93010 ELECTROCARDIOGRAM REPORT: CPT

## 2022-05-04 PROCEDURE — 93283 PRGRMG EVAL IMPLANTABLE DFB: CPT | Mod: 26

## 2022-05-04 RX ORDER — MAGNESIUM SULFATE 500 MG/ML
2 VIAL (ML) INJECTION ONCE
Refills: 0 | Status: COMPLETED | OUTPATIENT
Start: 2022-05-04 | End: 2022-05-04

## 2022-05-04 RX ORDER — MESALAMINE 400 MG
1200 TABLET, DELAYED RELEASE (ENTERIC COATED) ORAL
Qty: 0 | Refills: 0 | DISCHARGE

## 2022-05-04 RX ADMIN — Medication 650 MILLIGRAM(S): at 13:37

## 2022-05-04 RX ADMIN — Medication 1: at 08:27

## 2022-05-04 RX ADMIN — Medication 1200 MILLIGRAM(S): at 05:48

## 2022-05-04 RX ADMIN — AMLODIPINE BESYLATE 5 MILLIGRAM(S): 2.5 TABLET ORAL at 12:11

## 2022-05-04 RX ADMIN — Medication 1200 MILLIGRAM(S): at 13:33

## 2022-05-04 RX ADMIN — Medication 650 MILLIGRAM(S): at 12:24

## 2022-05-04 RX ADMIN — CHLORHEXIDINE GLUCONATE 1 APPLICATION(S): 213 SOLUTION TOPICAL at 05:48

## 2022-05-04 RX ADMIN — Medication 25 GRAM(S): at 12:10

## 2022-05-04 RX ADMIN — Medication 20 MILLIGRAM(S): at 05:48

## 2022-05-04 RX ADMIN — Medication 250 MILLIGRAM(S): at 12:11

## 2022-05-04 NOTE — PROGRESS NOTE ADULT - SUBJECTIVE AND OBJECTIVE BOX
Patient is a 72y old  Female who presents with a chief complaint of INCOMPLETE NOTE (04 May 2022 10:37)    HPI:  Patient is a 72 year old female with PMHx opf LBBB, HTN, Diabetes Mellitus Type 2, and mitral valve prolapse presents to the ED following an episode of bradycardia and chest discomfort. Patient was planned for colonoscopy today when her HS was found to be 35 - 45 bpm. Patient immediately  called her cardiologist Dr. Chino who recommenced patient report to the ED for evaluation. At patients last outpatient cardiology visit in 2022, ekg demonstrated baseline LBBB. Patient denies headache, blurred vision, syncope, dizziness, chest pain, shortness of breath, palpitations, diaphoresis, abdominal pain, or nausea/vomitting/diarrhea.      In the ED, patient initially hypertensive to 198/92 with HR of 45. Hypertension self resolved, now 133/55 with HR of 55. Labs revealing Mg 1.6 and BNP 1132. Troponin negative x1. EKG demonstrating complete heart block. Patient is afebrile and saturating well on room air. Patient currently asymptomatic. She was evaluated by the cardiology fellow, recommending admission to CCU for monitoring.       (2022 20:44)       INTERVAL HPI/OVERNIGHT EVENTS:   No overnight events   Afebrile, hemodynamically stable     Subjective:    ICU Vital Signs Last 24 Hrs  T(C): 36.9 (04 May 2022 08:00), Max: 36.9 (04 May 2022 04:49)  T(F): 98.5 (04 May 2022 08:00), Max: 98.5 (04 May 2022 08:00)  HR: 91 (04 May 2022 12:15) (42 - 97)  BP: 145/85 (04 May 2022 12:15) (119/66 - 180/76)  BP(mean): 95 (04 May 2022 04:49) (84 - 109)  ABP: --  ABP(mean): --  RR: 20 (04 May 2022 12:15) (20 - 41)  SpO2: 96% (04 May 2022 05:43) (96% - 96%)    I&O's Summary    03 May 2022 07:01  -  04 May 2022 07:00  --------------------------------------------------------  IN: 750 mL / OUT: 0 mL / NET: 750 mL          Daily Height in cm: 162.56 (03 May 2022 15:00)    Daily Weight in k.9 (04 May 2022 02:26)    Adult Advanced Hemodynamics Last 24 Hrs  CVP(mm Hg): --  CVP(cm H2O): --  CO: --  CI: --  PA: --  PA(mean): --  PCWP: --  SVR: --  SVRI: --  PVR: --  PVRI: --    EKG/Telemetry Events:  No tele events    MEDICATIONS  (STANDING):  amLODIPine   Tablet 5 milliGRAM(s) Oral daily  atorvastatin 40 milliGRAM(s) Oral at bedtime  chlorhexidine 4% Liquid 1 Application(s) Topical <User Schedule>  enalapril 20 milliGRAM(s) Oral two times a day  insulin lispro (ADMELOG) corrective regimen sliding scale   SubCutaneous three times a day before meals  insulin lispro (ADMELOG) corrective regimen sliding scale   SubCutaneous at bedtime  lactated ringers. 1000 milliLiter(s) (75 mL/Hr) IV Continuous <Continuous>  mesalamine DR Capsule 1200 milliGRAM(s) Oral every 8 hours    MEDICATIONS  (PRN):  acetaminophen     Tablet .. 650 milliGRAM(s) Oral every 6 hours PRN Moderate Pain (4 - 6)  morphine  - Injectable 2 milliGRAM(s) IV Push every 10 minutes PRN Severe Pain (7 - 10)  ondansetron Injectable 4 milliGRAM(s) IV Push once PRN Nausea and/or Vomiting      PHYSICAL EXAM:  Site of AICD implantation clean and dry without any surrounding erythema or discharge.   General: WN/WD NAD  HEENT: PERRLA, EOMI, moist mucous membranes  Neurology: A&Ox3, nonfocal, ZAZUETA x 4  Respiratory: CTA B/L, normal respiratory effort, no wheezes, crackles, rales  CV: bradycardic, regular rhythm, S1S2, no murmurs, rubs or gallops  Abdominal: Soft, NT, ND +BS, Last BM  Extremities: No edema, + peripheral pulses        LABS:                        12.9   6.71  )-----------( 215      ( 04 May 2022 11:48 )             39.0     05-04    140  |  103  |  11  ----------------------------<  141<H>  4.3   |  23  |  0.5<L>    Ca    9.0      04 May 2022 06:23  Mg     1.7     05-04    TPro  6.4  /  Alb  4.1  /  TBili  1.3<H>  /  DBili  x   /  AST  11  /  ALT  14  /  AlkPhos  58  05-04    LIVER FUNCTIONS - ( 04 May 2022 06:23 )  Alb: 4.1 g/dL / Pro: 6.4 g/dL / ALK PHOS: 58 U/L / ALT: 14 U/L / AST: 11 U/L / GGT: x             CAPILLARY BLOOD GLUCOSE      POCT Blood Glucose.: 150 mg/dL (04 May 2022 11:32)  POCT Blood Glucose.: 162 mg/dL (04 May 2022 07:50)  POCT Blood Glucose.: 166 mg/dL (03 May 2022 21:13)  POCT Blood Glucose.: 111 mg/dL (03 May 2022 16:58)  POCT Blood Glucose.: 98 mg/dL (03 May 2022 14:52)                  RADIOLOGY & ADDITIONAL TESTS:  CXR:        Care Discussed with Consultants/Other Providers [ x] YES  [ ] NO

## 2022-05-04 NOTE — DISCHARGE NOTE PROVIDER - NSDCCPCAREPLAN_GEN_ALL_CORE_FT
PRINCIPAL DISCHARGE DIAGNOSIS  Diagnosis: Complete heart block  Assessment and Plan of Treatment:   You had a third degree heart block which is explained below:  Third-degree (complete) heart block. None of the electrical impulses from the atria reaches the ventricles. When this happens, a natural pacemaker takes over, but this results in slow and sometimes unreliable electrical impulses to control the beat of the ventricles.  Bradycardia is a slower than normal heart rate. The hearts of adults at rest usually beat between 60 and 100 times a minute. If you have bradycardia (sneh-c-RRIE-broderick-uh), your heart beats fewer than 60 times a minute.  Bradycardia can be a serious problem if the heart doesn't pump enough oxygen-rich blood to the body. For some people, however, bradycardia doesn't cause symptoms or complications.  An implanted pacemaker can correct bradycardia and help your heart maintain an appropriate rate.  Bradycardia can also occur because electrical signals transmitted through the atria aren't transmitted to the ventricles (heart block, or atrioventricular block).  Heart blocks are classified based on the degree to which signals from the atria reach your heart's main pumping chambers (ventricles).

## 2022-05-04 NOTE — PROGRESS NOTE ADULT - ASSESSMENT
Cardiology: Dr. Chino    Assessment: 72 year old female with PMH of LBBB, HTN, Diabetes Mellitus Type 2, and mitral valve prolapse presents to the ED following an episode of bradycardia and chest discomfort following a colonoscopy. Found to be in CHB 40s. Sarcoidosis found on CMRI. Patient s/p DC AICD implant, POD#1 and feeling well    Lyme neg. EF74%. TSH 1.06.     Impression:  S/P DC AICD ([facility])  Sarcoidosis on CMRI  CHB 40s   Hx LBBB, HTN, DMII, Mitral valve prolapse  Hx severe diverticulosis (colonoscopy 4/29/22)    Plan:  - CXR completed  - Interrogation completed, numbers within appropriate range  - Continue all other home medications  - Do not lift L arm above shoulder height or lift > 5 lbs for 6 weeks  - Do not get area wet and leave dressing in place until follow up appt  - Follow up with Dr Rosenthal in one week

## 2022-05-04 NOTE — PROGRESS NOTE ADULT - ASSESSMENT
Sinus rhythm with Complete Heart Block - hemodynamically stable   Diabetes Mellitus Type 2  HTN  3.0cm mass-like liver structure    PLAN:    CNS:   Avoid CNS Depressants     HEENT:   Oral care. Aspiration precautions     PULMONARY:   HOB @ 45. Monitor Pulse Ox. Keep > 93%. Supplement as needed.    CARDIOVASCULAR:   currently HD stable and asymptomatic  CCU monitoring   cont Norvasc and Enalapril    Avoid AVNRT  MR heart shows delayed gadolinium enhancement suggestive of  infiltrative disease  S/P AICD    GI:   feeding   MR shows incidental 3.0cm liver mass    RENAL:   Avoid nephrotoxic agents, Cr at baseline     INFECTIOUS DISEASE:   no evidence of any underlying infectious process     HEMATOLOGICAL:  Pt refusing heparin for DVT PPX 2/2 recent GI procedure; SCD    ENDOCRINE  : Monitor FSG,   HbA1c 7.4  LDL 98  TSH 1.07    MUSCULOSKELETAL: Ambulate as tolerated     CODE STATUS: Full Code     DISPOSITION:  CCU      Sinus rhythm with Complete Heart Block - hemodynamically stable   Diabetes Mellitus Type 2  HTN  3.0cm mass-like liver structure    PLAN:    CNS:   Avoid CNS Depressants     HEENT:   Oral care. Aspiration precautions     PULMONARY:   HOB @ 45. Monitor Pulse Ox. Keep > 93%. Supplement as needed.    CARDIOVASCULAR:   currently HD stable and asymptomatic  CCU monitoring   cont Norvasc and Enalapril    Avoid AVNRT  MR heart shows delayed gadolinium enhancement suggestive of  infiltrative disease  S/P AICD    GI:   feeding   MR shows incidental 3.0cm liver mass  WIll follow up with GI Dr wells outpatient. Liver enzyme wnl    RENAL:   Avoid nephrotoxic agents, Cr at baseline     INFECTIOUS DISEASE:   no evidence of any underlying infectious process     HEMATOLOGICAL:  Pt refusing heparin for DVT PPX 2/2 recent GI procedure; SCD    ENDOCRINE  : Monitor FSG,   HbA1c 7.4  LDL 98  TSH 1.07    MUSCULOSKELETAL: Ambulate as tolerated     CODE STATUS: Full Code     DISPOSITION:  CCU

## 2022-05-04 NOTE — DISCHARGE NOTE PROVIDER - NSDCFUSCHEDAPPT_GEN_ALL_CORE_FT
Lora Cotto  [facility] Physician [facility]  Cardio [address] [facility]  Scheduled Appointment: 05/11/2022

## 2022-05-04 NOTE — DISCHARGE NOTE PROVIDER - NSDCFUADDAPPT_GEN_ALL_CORE_FT
Please remember to Follow up with hepatology for evaluation of 3.0 cm structure within the liver incompletely characterized. Please remember to Follow up with hepatology for evaluation of 3.0 cm structure within the liver incompletely characterized.    Please remember to F/U with your gastroenterologist Dr. Kramer as scheduled.  Please remember to Follow up with hepatology Dr. Darren Clarke for evaluation of 3.0 cm structure within the liver incompletely characterized. I called the office but the  that does scheduling for Dr. Banks is out so the office will call your phone number and set up the appointment.     Please remember to F/U with your gastroenterologist Dr. Kramer as scheduled.

## 2022-05-04 NOTE — DISCHARGE NOTE PROVIDER - NSDCMRMEDTOKEN_GEN_ALL_CORE_FT
amLODIPine 5 mg oral tablet: 1 tab(s) orally once a day  Crestor 10 mg oral tablet: orally once a day  enalapril 20 mg oral tablet: orally 2 times a day  metFORMIN 500 mg oral tablet, extended release: orally 2 times a day   amLODIPine 5 mg oral tablet: 1 tab(s) orally once a day  Crestor 10 mg oral tablet: orally once a day  Delzicol: 1200 milligram(s) orally 3 times a day ordered by your gastroenterologist complete as recomended by your gastroenterologist  enalapril 20 mg oral tablet: orally 2 times a day  metFORMIN 500 mg oral tablet, extended release: orally 2 times a day

## 2022-05-04 NOTE — DISCHARGE NOTE PROVIDER - NSDCCPTREATMENT_GEN_ALL_CORE_FT
PRINCIPAL PROCEDURE  Procedure: AICD implantation  Findings and Treatment: You had an AICD implantation with a pacemaker to keep your heart rate beating at a normal rate.

## 2022-05-04 NOTE — DISCHARGE NOTE NURSING/CASE MANAGEMENT/SOCIAL WORK - NSDCPEFALRISK_GEN_ALL_CORE
For information on Fall & Injury Prevention, visit: [email or web address] OR  [email or web address] OR  https://www.cdc.gov/steadi/patient.html

## 2022-05-04 NOTE — PROGRESS NOTE ADULT - ATTENDING COMMENTS
Seen / examined and above reviewed.    Baseline LBBB  Possible Infiltrative Cardiomyopathy (MRI)    s/p AICD without complication.  Dressing C/D/I.  No hematoma.  No PTX.    Liver finding on MRI / importance of follow-up discussed.  Hepatology evaluation arranged (Dr. Flores).    - Cont Norvasc and Enalapril.  - Discharge today.  - Outpatient follow-up.

## 2022-05-04 NOTE — PROGRESS NOTE ADULT - NS ATTEND AMEND GEN_ALL_CORE FT
CXR and interrogation acceptable  Ok to DC from EP standpoint  1- week incision check
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 Pt has noticed several weeks of fatigue and dyspnea on exertion. 2D echo with normal EF and severe LAE. Lyme titers neg.     # CHB without signs of hemodynamic compromise    Rec   - Monitor on tele  - Check TSH, free T4  - Cardiac MRI to eval for infiltrative heart disease (pt states younger sister has ICD)  - Hold all AVN blocking agents  - Monitor lytes and keep K>4 and Mg>2  - Will follow  - If no reversible agent found, will need DC PPM. If MRI suggestive of infiltrative heart disease such as sarcoid, pt will need DC ICD.   - Keep pt NPO after midnight for possible device after MRI tomorrow

## 2022-05-04 NOTE — DISCHARGE NOTE PROVIDER - NSDCFUADDINST_GEN_ALL_CORE_FT
- Do not lift L arm above shoulder height or lift > 5 lbs for 6 weeks  - Do not get area wet and leave dressing in place until follow up appt  - Follow up with Dr Rosenthal in one week

## 2022-05-04 NOTE — DISCHARGE NOTE PROVIDER - HOSPITAL COURSE
72 year old female with PMHx of LBBB, HTN, Diabetes Mellitus Type 2, and mitral valve prolapse presented to the ED following an episode of bradycardia and chest discomfort. Patient was planned for colonoscopy 4/29/22 when her HR was found to be 35-45 bpm. Patient immediately called her cardiologist Dr. Chino who recommended patient report to the ED for evaluation. At patient's last outpatient cardiology visit in Feb 2022, EKG demonstrated baseline LBBB. Patient denied headache, blurred vision, syncope, dizziness, chest pain, shortness of breath, palpitations, diaphoresis, abdominal pain, or nausea/vomitting/diarrhea.      In the ED, patient initially hypertensive to 198/92 with HR of 45. Hypertension self resolved. Troponin negative x1. EKG demonstrating complete heart block. Patient currently asymptomatic. She was admitted to CCU for monitoring and planned with EP for pacemaker after MR heart.     While in CCU pt remained asymptomatic. MR heart showed "mid myocardial delayed enhancement in the basal lateral wall of the left ventricle which may be seen with myocarditis with differential diagnosis of infiltrative disease such as sarcoidosis." Defibrillator placed 5/3/22.    Of note, MRI also showed "3.0 cm structure within the liver incompletely characterized."    3c course:    Patient had a chest  ray after implantation of the AICD for verification of lead placement and an AICD interrogation which showed  that the  numbers  were within appropriate range

## 2022-05-04 NOTE — DISCHARGE NOTE NURSING/CASE MANAGEMENT/SOCIAL WORK - NSDCFUADDAPPT_GEN_ALL_CORE_FT
Please remember to Follow up with hepatology Dr. Darren Clarke for evaluation of 3.0 cm structure within the liver incompletely characterized. I called the office but the  that does scheduling for Dr. Banks is out so the office will call your phone number and set up the appointment.     Please remember to F/U with your gastroenterologist Dr. Kramer as scheduled.

## 2022-05-04 NOTE — DISCHARGE NOTE NURSING/CASE MANAGEMENT/SOCIAL WORK - PATIENT PORTAL LINK FT
You can access the FollowMyHealth Patient Portal offered by [facility] by registering at the following website: [email or web address]. By joining [facility]’s FollowMyHealth portal, you will also be able to view your health information using other applications (apps) compatible with our system.

## 2022-05-04 NOTE — DISCHARGE NOTE PROVIDER - CARE PROVIDER_API CALL
Sanket Rosenthal (MD)  Cardiac Electrophysiology; Cardiology; Internal Medicine  [address] [place] [address]  Phone: [phone]  Fax: [phone]  Follow Up Time: 1 week    Ronnie Sloan (DO)  Internal Medicine  [address] [place] [address]  Phone: [phone]  Fax: [phone]  Follow Up Time: 2 weeks   Sanket Rosenthal)  Cardiac Electrophysiology; Cardiology; Internal Medicine  [address] [place] [address]  Phone: [phone]  Fax: [phone]  Follow Up Time: 1 week    Ronnie Sloan (DO)  Internal Medicine  [address] [place] [address]  Phone: [phone]  Fax: [phone]  Follow Up Time: 2 weeks    Mark Banks)  Internal Medicine  [address] [place] [address]  Phone: [phone]  Fax: [phone]  Follow Up Time: 2 weeks   Sanket Rosenthal (MD)  Cardiac Electrophysiology; Cardiology; Internal Medicine  [address] [place] [address]  Phone: [phone]  Fax: [phone]  Follow Up Time: 1 week    Ronnie Sloan (DO)  Internal Medicine  [address] [place] [address]  Phone: [phone]  Fax: [phone]  Follow Up Time: 2 weeks    Mark Banks)  Internal Medicine  [address] [place] [address]  Phone: [phone]  Fax: [phone]  Follow Up Time: 2 weeks    Josh Kramer (DO)  Gastroenterology  [address] [place] [address]  Phone: [phone]  Fax: [phone]  Scheduled Appointment: 05/17/2022

## 2022-05-04 NOTE — DISCHARGE NOTE PROVIDER - PROVIDER TOKENS
PROVIDER:[TOKEN:[[id number]],FOLLOWUP:[1 week]],PROVIDER:[TOKEN:[[id number]],FOLLOWUP:[2 weeks]] PROVIDER:[TOKEN:[[id number]],FOLLOWUP:[1 week]],PROVIDER:[TOKEN:[[id number]],FOLLOWUP:[2 weeks]],PROVIDER:[TOKEN:[[id number]],FOLLOWUP:[2 weeks]] PROVIDER:[TOKEN:[[id number]],FOLLOWUP:[1 week]],PROVIDER:[TOKEN:[[id number]],FOLLOWUP:[2 weeks]],PROVIDER:[TOKEN:[[id number]],FOLLOWUP:[2 weeks]],PROVIDER:[TOKEN:[[id number]],SCHEDULEDAPPT:[05/17/2022]]

## 2022-05-06 LAB — B BURGDOR DNA SPEC QL NAA+PROBE: NEGATIVE — SIGNIFICANT CHANGE UP

## 2022-05-11 ENCOUNTER — APPOINTMENT (OUTPATIENT)
Dept: CARDIOLOGY | Facility: CLINIC | Age: 72
End: 2022-05-11
Payer: COMMERCIAL

## 2022-05-11 VITALS
SYSTOLIC BLOOD PRESSURE: 129 MMHG | HEIGHT: 61 IN | BODY MASS INDEX: 24.17 KG/M2 | DIASTOLIC BLOOD PRESSURE: 83 MMHG | TEMPERATURE: 97.8 F | HEART RATE: 95 BPM | WEIGHT: 128 LBS

## 2022-05-11 DIAGNOSIS — D86.85 SARCOID MYOCARDITIS: ICD-10-CM

## 2022-05-11 DIAGNOSIS — I34.1 NONRHEUMATIC MITRAL (VALVE) PROLAPSE: ICD-10-CM

## 2022-05-11 DIAGNOSIS — Z45.02 ENCOUNTER FOR ADJUSTMENT AND MANAGEMENT OF AUTOMATIC IMPLANTABLE CARDIAC DEFIBRILLATOR: ICD-10-CM

## 2022-05-11 DIAGNOSIS — Z80.1 FAMILY HISTORY OF MALIGNANT NEOPLASM OF TRACHEA, BRONCHUS AND LUNG: ICD-10-CM

## 2022-05-11 DIAGNOSIS — I10 ESSENTIAL (PRIMARY) HYPERTENSION: ICD-10-CM

## 2022-05-11 DIAGNOSIS — E11.9 TYPE 2 DIABETES MELLITUS W/OUT COMPLICATIONS: ICD-10-CM

## 2022-05-11 DIAGNOSIS — Z82.49 FAMILY HISTORY OF ISCHEMIC HEART DISEASE AND OTHER DISEASES OF THE CIRCULATORY SYSTEM: ICD-10-CM

## 2022-05-11 DIAGNOSIS — Z48.89 ENCOUNTER FOR OTHER SPECIFIED SURGICAL AFTERCARE: ICD-10-CM

## 2022-05-11 DIAGNOSIS — Z95.810 PRESENCE OF AUTOMATIC (IMPLANTABLE) CARDIAC DEFIBRILLATOR: ICD-10-CM

## 2022-05-11 PROCEDURE — 93283 PRGRMG EVAL IMPLANTABLE DFB: CPT

## 2022-05-11 PROCEDURE — 93000 ELECTROCARDIOGRAM COMPLETE: CPT | Mod: 59

## 2022-05-11 PROCEDURE — 99024 POSTOP FOLLOW-UP VISIT: CPT

## 2022-05-13 DIAGNOSIS — R00.1 BRADYCARDIA, UNSPECIFIED: ICD-10-CM

## 2022-05-13 DIAGNOSIS — I10 ESSENTIAL (PRIMARY) HYPERTENSION: ICD-10-CM

## 2022-05-13 DIAGNOSIS — E11.9 TYPE 2 DIABETES MELLITUS WITHOUT COMPLICATIONS: ICD-10-CM

## 2022-05-13 DIAGNOSIS — I34.1 NONRHEUMATIC MITRAL (VALVE) PROLAPSE: ICD-10-CM

## 2022-05-13 DIAGNOSIS — I44.2 ATRIOVENTRICULAR BLOCK, COMPLETE: ICD-10-CM

## 2022-05-13 DIAGNOSIS — I44.7 LEFT BUNDLE-BRANCH BLOCK, UNSPECIFIED: ICD-10-CM

## 2022-05-13 DIAGNOSIS — D86.9 SARCOIDOSIS, UNSPECIFIED: ICD-10-CM

## 2022-05-13 DIAGNOSIS — Z82.49 FAMILY HISTORY OF ISCHEMIC HEART DISEASE AND OTHER DISEASES OF THE CIRCULATORY SYSTEM: ICD-10-CM

## 2022-05-18 ENCOUNTER — APPOINTMENT (OUTPATIENT)
Dept: GASTROENTEROLOGY | Facility: CLINIC | Age: 72
End: 2022-05-18
Payer: COMMERCIAL

## 2022-05-18 VITALS
BODY MASS INDEX: 24.17 KG/M2 | HEART RATE: 87 BPM | DIASTOLIC BLOOD PRESSURE: 98 MMHG | WEIGHT: 128 LBS | SYSTOLIC BLOOD PRESSURE: 155 MMHG | HEIGHT: 61 IN

## 2022-05-18 DIAGNOSIS — Z82.49 FAMILY HISTORY OF ISCHEMIC HEART DISEASE AND OTHER DISEASES OF THE CIRCULATORY SYSTEM: ICD-10-CM

## 2022-05-18 DIAGNOSIS — Z78.9 OTHER SPECIFIED HEALTH STATUS: ICD-10-CM

## 2022-05-18 DIAGNOSIS — Z86.2 PERSONAL HISTORY OF DISEASES OF THE BLOOD AND BLOOD-FORMING ORGANS AND CERTAIN DISORDERS INVOLVING THE IMMUNE MECHANISM: ICD-10-CM

## 2022-05-18 DIAGNOSIS — R16.0 HEPATOMEGALY, NOT ELSEWHERE CLASSIFIED: ICD-10-CM

## 2022-05-18 DIAGNOSIS — Z87.19 PERSONAL HISTORY OF OTHER DISEASES OF THE DIGESTIVE SYSTEM: ICD-10-CM

## 2022-05-18 DIAGNOSIS — Z83.3 FAMILY HISTORY OF DIABETES MELLITUS: ICD-10-CM

## 2022-05-18 PROCEDURE — 99205 OFFICE O/P NEW HI 60 MIN: CPT

## 2022-05-18 NOTE — REVIEW OF SYSTEMS
[Arthralgias] : arthralgias [Suicidal] : suicidal [Easy Bleeding] : a tendency for easy bleeding [Easy Bruising] : a tendency for easy bruising [Fever] : no fever [Chills] : no chills [Eye Pain] : no eye pain [Earache] : no earache [Chest Pain] : no chest pain [Palpitations] : no palpitations [Cough] : no cough [SOB on Exertion] : no shortness of breath during exertion

## 2022-05-18 NOTE — PHYSICAL EXAM
[General Appearance - Alert] : alert [General Appearance - Well Nourished] : well nourished [General Appearance - Well Developed] : well developed [Sclera] : the sclera and conjunctiva were normal [Outer Ear] : the ears and nose were normal in appearance [Respiration, Rhythm And Depth] : normal respiratory rhythm and effort [Exaggerated Use Of Accessory Muscles For Inspiration] : no accessory muscle use [Auscultation Breath Sounds / Voice Sounds] : lungs were clear to auscultation bilaterally [Apical Impulse] : the apical impulse was normal [Heart Sounds] : normal S1 and S2 [Murmurs] : no murmurs [Abdomen Soft] : soft [] : no rash [No Focal Deficits] : no focal deficits [Scleral Icterus] : No Scleral Icterus [Spider Angioma] : No spider angioma(s) were observed [Abdominal  Ascites] : no ascites [Ascites Shifting Dullness] : no shifting dullness of ascites [Splenomegaly] : no splenomegaly [Liver Palpable ___ Finger Breadths Below Costal Margin] : was not palpable below costal margin [Asterixis] : no asterixis observed [FreeTextEntry1] : minimal edema

## 2022-05-18 NOTE — ASSESSMENT
[FreeTextEntry1] : 72 year old F with HTN HLD T2DM recently diagnosed ulcerative colitis developed bradycardia post colonoscopy and had cardiac MRI showed sarcoidosis  s/p AICD placed seen for 3 cm liver mass seen on imaging.\par \par Liver mass\par 3 cm liver mass seen on cardiac MRI.No prior imaging. \par Hx of cardiac sarcoidosis but not likely cause of liver lesion. No hx of hormonal therapy\par Will get cardiology input on whether she can have MRI liver. If not will get CT abdomen liver protocol\par \par Elevated bilirubin\par Will check liver tests with bilirubin fraction\par \par Health maintenance\par Colonoscopy done recently.\par Will get HCV screening done\par \par Follow up with results. \par

## 2022-05-18 NOTE — REASON FOR VISIT
[Family Member] : family member [Initial Evaluation] : an initial evaluation [FreeTextEntry1] : NP -  hepatic lesion

## 2022-05-18 NOTE — HISTORY OF PRESENT ILLNESS
[Diffuse Joint Pain (Arthralgias)] : arthralgias stable [___] : Performed [unfilled] [IV Drug Use] : no IV drug use [Tattoo] : no tattoos [Cocaine Use] : no cocaine use [Wt Gain ___ Lbs] : no recent weight gain [Wt Loss ___ Lbs] : no recent weight loss [de-identified] : Patient reports having bloody diarrhea for several months and had colonoscopy done which showed colitis and patient reports pathology showed ulcerative colitis.  She was noted to have bradycardia post procedure and went on to have a cardiac evaluation. Cardiac MRI suggestive of sarcoidosis and showed 3 cm liver lesion. Patient had AICD placed. \par No RUQ pain. Has been having mid abdominal pain intermittently for years which is better with bowel movement. No hx of jaundice. \par No hx of birth control pills or hormone therapy. \par Patient denies fever or chills. \par Wt stable.  [de-identified] : No [de-identified] :  2 weeks ago.  [FreeTextEntry1] : \par Lives with \par Non smoker\par No alcohol use\par No drug use\par Mother had lung ca DM HTN\par FAther had LBBB\par Sister had PPM\par PGM  at 42 due to CAD

## 2022-05-26 PROBLEM — Z95.810 S/P INTERNAL CARDIAC DEFIBRILLATOR PROCEDURE: Status: ACTIVE | Noted: 2022-05-26

## 2022-05-26 PROBLEM — I10 HYPERTENSION, UNSPECIFIED TYPE: Status: ACTIVE | Noted: 2022-05-18

## 2022-05-26 PROBLEM — E11.9 TYPE 2 DIABETES MELLITUS: Status: ACTIVE | Noted: 2022-05-18

## 2022-05-26 PROBLEM — Z45.02 ENCOUNTER FOR INTERROGATION OF CARDIAC DEFIBRILLATOR: Status: ACTIVE | Noted: 2022-05-26

## 2022-05-26 PROBLEM — Z82.49 FAMILY HISTORY OF LEFT BUNDLE BRANCH BLOCK (LBBB): Status: ACTIVE | Noted: 2022-05-26

## 2022-05-26 PROBLEM — Z48.89 ENCOUNTER FOR POSTOPERATIVE WOUND CHECK: Status: ACTIVE | Noted: 2022-05-26

## 2022-05-26 PROBLEM — I34.1 MITRAL VALVE PROLAPSE: Status: ACTIVE | Noted: 2022-05-26

## 2022-05-26 PROBLEM — D86.85 CARDIAC SARCOIDOSIS: Status: ACTIVE | Noted: 2022-05-11

## 2022-05-26 PROBLEM — Z80.1 FAMILY HISTORY OF LUNG CANCER: Status: ACTIVE | Noted: 2022-05-26

## 2022-05-26 RX ORDER — ROSUVASTATIN CALCIUM 10 MG/1
10 TABLET, FILM COATED ORAL
Refills: 0 | Status: ACTIVE | COMMUNITY

## 2022-05-26 NOTE — ASSESSMENT
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 There are no signs or symptoms of infection of inflammation. There is no redness, no swelling, no erythema. The patient has no pain. \par - Device interrogation normal. All parameters stable.\par - Patient is enrolled in remote monitoring services. I reviewed remote transmission process with the patient, as well as schedule and ability to do manual transmission. We also spoke about associated co-payments with remote monitoring that may not be covered by insurance. \par \par Sarcoid on MRI - provided referral for Dr. Santos\par Son has ankylosing spondylitis, sister undergoing cardiac PET - rheum referral provided to r/o autoimmune dz\par \par Patient to follow with Dr. Woods at 501 on 1st floor

## 2022-05-26 NOTE — PROCEDURE
[No] : not [Sinus Bradycardia] : sinus bradycardia [See Device Printout] : See device printout [ICD] : Implantable cardioverter-defibrillator [DDD] : DDD [Lead Imp:  ___ohms] : lead impedance was [unfilled] ohms [Sensing Amplitude ___mv] : sensing amplitude was [unfilled] mv [___V @] : [unfilled] V [___ ms] : [unfilled] ms [de-identified] : [facility] [de-identified] : Resonate Q698 [de-identified] : [id number] [de-identified] : 5/3/22 [de-identified] : 60/130 [de-identified] : AP/ 2/74%\par 1 brief AT ~6 seconds

## 2022-05-26 NOTE — PHYSICAL EXAM
[Left Infraclavicular] : left infraclavicular area [Clean] : clean [Dry] : dry [Healing Well] : healing well [General Appearance - Well Developed] : well developed [Normal Appearance] : normal appearance [Well Groomed] : well groomed [General Appearance - Well Nourished] : well nourished [No Deformities] : no deformities [General Appearance - In No Acute Distress] : no acute distress [Heart Rate And Rhythm] : heart rate and rhythm were normal [Heart Sounds] : normal S1 and S2 [] : no respiratory distress [Respiration, Rhythm And Depth] : normal respiratory rhythm and effort [Exaggerated Use Of Accessory Muscles For Inspiration] : no accessory muscle use [Abdomen Soft] : soft [Nail Clubbing] : no clubbing of the fingernails [Cyanosis, Localized] : no localized cyanosis

## 2022-05-26 NOTE — HISTORY OF PRESENT ILLNESS
[Palpitations] : no palpitations [SOB] : no dyspnea [Syncope] : no syncope [Dizziness] : no dizziness [Chest Pain] : no chest pain or discomfort [ICD Shocks] : no recent ICD shocks [Shoulder Pain] : no shoulder pain [Pain at Site] : no pain at device site [Erythema at Site] : no erythema at device site [Swelling at Site] : no swelling at device site [de-identified] : Cardio: Dr. Chino\par \par \par 72 year old female with PMH of LBBB, HTN, Diabetes Mellitus Type 2, and mitral valve prolapse presents to the ED following an episode of bradycardia and chest discomfort following a colonoscopy. Found to be in CHB 40s. Sarcoidosis found on CMRI. Patient s/p DC AICD implant.\par \par (Of note, patient's sister is Dai Urena, also seen by this office for her device). \par \par She presents today for wound check and device interrogation. She has no cardiac complaints.

## 2022-06-24 ENCOUNTER — OUTPATIENT (OUTPATIENT)
Dept: OUTPATIENT SERVICES | Facility: HOSPITAL | Age: 72
LOS: 1 days | Discharge: HOME | End: 2022-06-24

## 2022-06-24 DIAGNOSIS — M21.611 BUNION OF RIGHT FOOT: Chronic | ICD-10-CM

## 2022-06-24 DIAGNOSIS — Z98.890 OTHER SPECIFIED POSTPROCEDURAL STATES: Chronic | ICD-10-CM

## 2022-06-24 DIAGNOSIS — R16.0 HEPATOMEGALY, NOT ELSEWHERE CLASSIFIED: ICD-10-CM

## 2022-06-24 PROCEDURE — 74170 CT ABD WO CNTRST FLWD CNTRST: CPT | Mod: 26

## 2022-07-06 ENCOUNTER — APPOINTMENT (OUTPATIENT)
Dept: GASTROENTEROLOGY | Facility: CLINIC | Age: 72
End: 2022-07-06

## 2022-07-06 DIAGNOSIS — D18.03 HEMANGIOMA OF INTRA-ABDOMINAL STRUCTURES: ICD-10-CM

## 2022-07-06 DIAGNOSIS — R17 UNSPECIFIED JAUNDICE: ICD-10-CM

## 2022-07-06 PROCEDURE — 99443: CPT

## 2022-07-06 NOTE — ASSESSMENT
[FreeTextEntry1] : 72 year old F with HTN HLD T2DM recently diagnosed ulcerative colitis developed bradycardia post colonoscopy and had cardiac MRI showed sarcoidosis  s/p AICD placed seen for 3 cm liver mass seen on imaging.\par \par Hemangioma \par 3 cm liver mass seen on cardiac MRI.No prior imaging. \par Hx of cardiac sarcoidosis but not likely cause of liver lesion. No hx of hormonal therapy\par CT liver protocol shows hemangioma. \par Follow up as needed. \par \par Elevated bilirubin\par Repeat  liver tests normal. Bilirubin 1.3 Could be Gilbert's syndrome\par \par Pancreatic duct dilatation\par Borderline PD dilatation. Likely benign 5 mm\par Concerned. Will refer to [facility]. \par \par Health maintenance\par Colonoscopy done recently.\par HCV screening negative. \par . \par

## 2022-07-06 NOTE — REVIEW OF SYSTEMS
[Fever] : no fever [Chills] : no chills [Eye Pain] : no eye pain [Earache] : no earache [Chest Pain] : no chest pain [Palpitations] : no palpitations [Cough] : no cough [SOB on Exertion] : no shortness of breath during exertion [Constipation] : no constipation [Diarrhea] : no diarrhea

## 2022-07-06 NOTE — HISTORY OF PRESENT ILLNESS
[Home] : at home, [unfilled] , at the time of the visit. [Medical Office: ([facility])___] : at the medical office located in  [Verbal consent obtained from patient] : the patient, [unfilled] [FreeTextEntry4] : Dalila [IV Drug Use] : no IV drug use [Tattoo] : no tattoos [Cocaine Use] : no cocaine use [Diffuse Joint Pain (Arthralgias)] : arthralgias stable [Wt Gain ___ Lbs] : no recent weight gain [Wt Loss ___ Lbs] : no recent weight loss [___] : Performed [unfilled] [de-identified] : Had CT abdomen done. No complaints.  [de-identified] : Patient reports having bloody diarrhea for several months and had colonoscopy done which showed colitis and patient reports pathology showed ulcerative colitis.  She was noted to have bradycardia post procedure and went on to have a cardiac evaluation. Cardiac MRI suggestive of sarcoidosis and showed 3 cm liver lesion. Patient had AICD placed. \par No RUQ pain. Has been having mid abdominal pain intermittently for years which is better with bowel movement. No hx of jaundice. \par No hx of birth control pills or hormone therapy. \par Patient denies fever or chills. \par Wt stable.  [de-identified] : No [de-identified] :  2 weeks ago.  [FreeTextEntry1] : \par Lives with \par Non smoker\par No alcohol use\par No drug use\par Mother had lung ca DM HTN\par FAther had LBBB\par Sister had PPM\par PGM  at 42 due to CAD

## 2022-07-20 ENCOUNTER — APPOINTMENT (OUTPATIENT)
Dept: CARDIOLOGY | Facility: CLINIC | Age: 72
End: 2022-07-20

## 2022-07-27 ENCOUNTER — APPOINTMENT (OUTPATIENT)
Dept: CARDIOLOGY | Facility: CLINIC | Age: 72
End: 2022-07-27

## 2022-07-27 VITALS
SYSTOLIC BLOOD PRESSURE: 128 MMHG | WEIGHT: 129 LBS | DIASTOLIC BLOOD PRESSURE: 80 MMHG | OXYGEN SATURATION: 96 % | BODY MASS INDEX: 24.35 KG/M2 | HEART RATE: 79 BPM | HEIGHT: 61 IN | TEMPERATURE: 96.7 F

## 2022-07-27 DIAGNOSIS — I44.7 LEFT BUNDLE-BRANCH BLOCK, UNSPECIFIED: ICD-10-CM

## 2022-07-27 DIAGNOSIS — I42.8 OTHER CARDIOMYOPATHIES: ICD-10-CM

## 2022-07-27 DIAGNOSIS — I44.30 UNSPECIFIED ATRIOVENTRICULAR BLOCK: ICD-10-CM

## 2022-07-27 DIAGNOSIS — I44.2 ATRIOVENTRICULAR BLOCK, COMPLETE: ICD-10-CM

## 2022-07-27 PROCEDURE — 93000 ELECTROCARDIOGRAM COMPLETE: CPT

## 2022-07-27 PROCEDURE — 99214 OFFICE O/P EST MOD 30 MIN: CPT | Mod: 25

## 2022-07-27 RX ORDER — METFORMIN ER 500 MG 500 MG/1
500 TABLET ORAL
Refills: 0 | Status: ACTIVE | COMMUNITY
Start: 2022-07-27

## 2022-08-09 NOTE — HISTORY OF PRESENT ILLNESS
[FreeTextEntry1] : Patient is a 71 y/o female with pmhx of DM2, HTN, HLD, recent covid infection, personal and strong family history of L BBB who was recently diagnosed with ulcerative colitis.  During GI workup, pt was found to be bradycardic during colonoscopy prompting further cardiac evaluation.\par \par Cardiac MRI results from 5/2/22 suggest cardiac infiltrative disease, s/p AICD 5/2022 for prevention.  \par \par 4/30/22 echo: EF 74%, normal LVSF, mod-severe left atrial enlargement, mild-mod MVR\par \par Pt denies any chest pain, shortness of breath, dizziness/LH, syncope/pre-syncope, LE swelling, PND.  She sleeps with 2-3 pillows at night because of her scoliosis.\par \par Overall, she states she feels better than she did prior to getting the AICD but her energy level is still not back to normal.

## 2022-08-09 NOTE — REVIEW OF SYSTEMS
[Feeling Fatigued] : feeling fatigued [Negative] : Heme/Lymph [FreeTextEntry2] : o [FreeTextEntry7] : better with UC treatment

## 2022-08-09 NOTE — ASSESSMENT
[FreeTextEntry1] : sarcoidosis v. viral myocarditis/L BBB/lethargy\par \par Plan:\par \par Euvolemic on exam \par Cardiac PET/CT scan to r/o sarcoidosis\par -cardiac rehab\par -f/u once results of PET scan are available \par Follow up with EP and primary cardiologist \par \par \par Mark Corrales MD, FACC, FHFSA \par Advanced Heart Failure/ Mechanical Circulatory Support\par Pulmonary Hypertension and Cardiac Amyloidosis \par [facility]\par [facility]  \par \par

## 2022-08-09 NOTE — CARDIOLOGY SUMMARY
[de-identified] : 7/27/22 ALBA MENJIVAR  [de-identified] :  4/30/22 Summary:\par  1. Normal global left ventricular systolic function.\par  2. Moderate to severe left atrial enlargement.\par  3. LV Ejection Fraction by Hu's Method with a biplane EF of 74 %.\par  4. Mildly increased LV wall thickness.\par  5. Spectral Doppler shows impaired relaxation pattern of left \par ventricular myocardial filling (Grade I diastolic dysfunction).\par  6. Mildly enlarged right atrium.\par  7. Mild to moderate mitral valve regurgitation.\par  8. Mitral annular calcification.\par  9. Thickening and calcification of the anterior and posterior mitral \par valve leaflets.\par 10. Mild-moderate tricuspid regurgitation.\par 11. Mild aortic regurgitation.\par 12. Sclerotic aortic valve with normal opening.\par 13. Mild pulmonic valve regurgitation.\par 14. Estimated pulmonary artery systolic pressure is 51.3 mmHg assuming a \par right atrial pressure of 15 mmHg, which is consistent with moderate \par pulmonary hypertension.\par  [de-identified] : 5/2/22  cardiac MRI\par LEFT:There is biatrial enlargement, left greater than right.  The left ventricular myocardium has normal thickness and signal.  There is normal left ventricular wall motion. There is mid myocardial delayed enhancement in the basal lateral wall which may be seen with myocarditis with differential diagnosis of infiltrative disease such as sarcoidosis. There is nonspecific enhancement in the inferior RV insertion point.\par RIGHT:\par The right cardiac chambers are normal in size.  The right ventricular myocardium has normal thickness and signal.  There is normal right ventricular wall motion. There is no late gadolinium enhancement.\par \par \par IMPRESSION:\par Normal biventricular function and volume.  Mid myocardial delayed enhancement in the basal lateral wall of the left \par ventricle which may be seen with myocarditis with differential diagnosis of infiltrative disease such as sarcoidosis.  3.0 cm structure within the liver incompletely characterized. Recommend dedicated MR abdomen with and without contrast for complete characterization.\par \par

## 2022-08-19 LAB — GLUCOSE BLDC GLUCOMTR-MCNC: 115 MG/DL — HIGH (ref 70–99)

## 2022-08-22 ENCOUNTER — LABORATORY RESULT (OUTPATIENT)
Age: 72
End: 2022-08-22

## 2022-08-22 ENCOUNTER — APPOINTMENT (OUTPATIENT)
Dept: GASTROENTEROLOGY | Facility: CLINIC | Age: 72
End: 2022-08-22

## 2022-08-22 VITALS — WEIGHT: 130 LBS | BODY MASS INDEX: 24.55 KG/M2 | HEIGHT: 61 IN

## 2022-08-22 PROCEDURE — 99204 OFFICE O/P NEW MOD 45 MIN: CPT

## 2022-08-22 NOTE — REASON FOR VISIT
[Consultation] : a consultation visit [Follow-Up: _____] : a [unfilled] follow-up visit [FreeTextEntry1] : CT results

## 2022-08-22 NOTE — PHYSICAL EXAM
[General Appearance - Alert] : alert [General Appearance - In No Acute Distress] : in no acute distress [Sclera] : the sclera and conjunctiva were normal [Outer Ear] : the ears and nose were normal in appearance [] : no respiratory distress [Abdomen Soft] : soft [Abdomen Tenderness] : non-tender [No CVA Tenderness] : no ~M costovertebral angle tenderness [Abnormal Walk] : normal gait [Skin Color & Pigmentation] : normal skin color and pigmentation [Oriented To Time, Place, And Person] : oriented to person, place, and time

## 2022-08-22 NOTE — ASSESSMENT
[FreeTextEntry1] : 72 year old F with HTN HLD T2DM recently diagnosed ulcerative colitis developed bradycardia post colonoscopy and had cardiac MRI showed sarcoidosis  s/p AICD placed seen for 3 cm liver mass seen on imaging.\par \par Hemangioma \par 3 cm liver mass seen on cardiac MRI.No prior imaging. \par Hx of cardiac sarcoidosis but not likely cause of liver lesion. No hx of hormonal therapy\par CT liver protocol shows hemangioma. \par Follow up as needed. \par \par Elevated bilirubin\par Repeat  liver tests normal. Bilirubin 1.3 Could be Gilbert's syndrome\par \par Pancreatic duct dilatation\par EUS\par \par

## 2022-08-22 NOTE — HISTORY OF PRESENT ILLNESS
[de-identified] : Patient reports having bloody diarrhea for several months and had colonoscopy done which showed colitis and patient reports pathology showed ulcerative colitis.  She was noted to have bradycardia post procedure and went on to have a cardiac evaluation. Cardiac MRI suggestive of sarcoidosis and showed 3 cm liver lesion. Patient had AICD placed.  No RUQ pain. Has been having mid abdominal pain intermittently for years which is better with bowel movement. No hx of jaundice.  No hx of birth control pills or hormone therapy.  Patient denies fever or chills.  Wt stable.  [FreeTextEntry1] : \par Lives with \par Non smoker\par No alcohol use\par No drug use\par Mother had lung ca DM HTN\par FAther had LBBB\par Sister had PPM\par PGM  at 42 due to CAD

## 2022-08-24 LAB — GLUCOSE BLDC GLUCOMTR-MCNC: 120 MG/DL — HIGH (ref 70–99)

## 2022-08-25 ENCOUNTER — RESULT REVIEW (OUTPATIENT)
Age: 72
End: 2022-08-25

## 2022-08-25 ENCOUNTER — OUTPATIENT (OUTPATIENT)
Dept: OUTPATIENT SERVICES | Facility: HOSPITAL | Age: 72
LOS: 1 days | Discharge: HOME | End: 2022-08-25

## 2022-08-25 ENCOUNTER — TRANSCRIPTION ENCOUNTER (OUTPATIENT)
Age: 72
End: 2022-08-25

## 2022-08-25 VITALS
RESPIRATION RATE: 20 BRPM | HEART RATE: 84 BPM | WEIGHT: 128.09 LBS | HEIGHT: 61 IN | DIASTOLIC BLOOD PRESSURE: 89 MMHG | SYSTOLIC BLOOD PRESSURE: 162 MMHG | TEMPERATURE: 97 F

## 2022-08-25 VITALS
HEART RATE: 81 BPM | RESPIRATION RATE: 20 BRPM | SYSTOLIC BLOOD PRESSURE: 150 MMHG | OXYGEN SATURATION: 98 % | DIASTOLIC BLOOD PRESSURE: 51 MMHG

## 2022-08-25 DIAGNOSIS — M21.611 BUNION OF RIGHT FOOT: Chronic | ICD-10-CM

## 2022-08-25 DIAGNOSIS — Z98.890 OTHER SPECIFIED POSTPROCEDURAL STATES: Chronic | ICD-10-CM

## 2022-08-25 PROCEDURE — 88312 SPECIAL STAINS GROUP 1: CPT | Mod: 26

## 2022-08-25 PROCEDURE — 88305 TISSUE EXAM BY PATHOLOGIST: CPT | Mod: 26

## 2022-08-25 PROCEDURE — 43239 EGD BIOPSY SINGLE/MULTIPLE: CPT | Mod: XU

## 2022-08-25 PROCEDURE — 43237 ENDOSCOPIC US EXAM ESOPH: CPT

## 2022-08-25 RX ORDER — MESALAMINE 400 MG
1200 TABLET, DELAYED RELEASE (ENTERIC COATED) ORAL
Qty: 0 | Refills: 0 | DISCHARGE

## 2022-08-25 NOTE — ASU PATIENT PROFILE, ADULT - FALL HARM RISK - UNIVERSAL INTERVENTIONS
Bed in lowest position, wheels locked, appropriate side rails in place/Call bell, personal items and telephone in reach/Instruct patient to call for assistance before getting out of bed or chair/Non-slip footwear when patient is out of bed/[place] to call system/Physically safe environment - no spills, clutter or unnecessary equipment/Purposeful Proactive Rounding/Room/bathroom lighting operational, light cord in reach

## 2022-08-25 NOTE — ASU PATIENT PROFILE, ADULT - ARRIVAL TIME
Pt to ED for several complaints. Pt states she has not felt well in a few weeks. Pt states she is having nasal congestion, bodyaches, chest pain, fatigue and has been out of her meds x2 weeks. Pt states she is from [place] and has not found a new pcp as of yet. No acute signs of distress noted. Friend at bedside.       Camilo Soto RN  04/10/21 [id number]
09:00

## 2022-08-25 NOTE — ASU PATIENT PROFILE, ADULT - NSICDXPASTSURGICALHX_GEN_ALL_CORE_FT
PAST SURGICAL HISTORY:  Bilateral bunions      delivery delivered     History of surgery tonsillectomy, hysterectomy, b/l eye surgery for muscle, knee miniscus sx right

## 2022-08-25 NOTE — ASU PATIENT PROFILE, ADULT - NSICDXPASTMEDICALHX_GEN_ALL_CORE_FT
PAST MEDICAL HISTORY:  Family history of medical problems cad, lbbb, hyperchoesteremia    H/O ulcerative colitis     Hypertension     Mitral insufficiency     Type 1 diabetes mellitus without complication

## 2022-08-25 NOTE — CHART NOTE - NSCHARTNOTEFT_GEN_A_CORE
PACU ANESTHESIA ADMISSION NOTE      Procedure: EGD with Bx/ EUS  Post op diagnosis:      ____  Intubated  TV:______       Rate: ______      FiO2: ______    _x___  Patent Airway    _x___  Full return of protective reflexes    ____  Full recovery from anesthesia / back to baseline status    Vitals:            T: 97.5               BP : 116/77              R:  14            Sat:   98%            P:  78      Mental Status:  _x___ Awake   _____ Alert   _____ Drowsy   _____ Sedated    Nausea/Vomiting:  _x___  NO       ______Yes,   See Post - Op Orders         Pain Scale (0-10):  __0___    Treatment: _x___ None    ____ See Post - Op/PCA Orders    Post - Operative Fluids:   __x__ Oral   ____ See Post - Op Orders    Plan: Discharge:   _x___Home       _____Floor     _____Critical Care    _____  Other:_________________    Comments:  No anesthesia issues or complications noted.  Discharge when criteria met.

## 2022-08-26 PROBLEM — Z87.19 PERSONAL HISTORY OF OTHER DISEASES OF THE DIGESTIVE SYSTEM: Chronic | Status: ACTIVE | Noted: 2022-08-25

## 2022-08-26 LAB
GLUCOSE BLDC GLUCOMTR-MCNC: 108 MG/DL — HIGH (ref 70–99)
SURGICAL PATHOLOGY STUDY: SIGNIFICANT CHANGE UP

## 2022-08-29 LAB — GLUCOSE BLDC GLUCOMTR-MCNC: 114 MG/DL — HIGH (ref 70–99)

## 2022-08-30 DIAGNOSIS — K86.89 OTHER SPECIFIED DISEASES OF PANCREAS: ICD-10-CM

## 2022-08-30 DIAGNOSIS — I34.0 NONRHEUMATIC MITRAL (VALVE) INSUFFICIENCY: ICD-10-CM

## 2022-08-30 DIAGNOSIS — I10 ESSENTIAL (PRIMARY) HYPERTENSION: ICD-10-CM

## 2022-08-30 DIAGNOSIS — E10.9 TYPE 1 DIABETES MELLITUS WITHOUT COMPLICATIONS: ICD-10-CM

## 2022-08-30 DIAGNOSIS — K29.50 UNSPECIFIED CHRONIC GASTRITIS WITHOUT BLEEDING: ICD-10-CM

## 2022-08-30 DIAGNOSIS — K20.90 ESOPHAGITIS, UNSPECIFIED WITHOUT BLEEDING: ICD-10-CM

## 2022-08-31 LAB — GLUCOSE BLDC GLUCOMTR-MCNC: 98 MG/DL — SIGNIFICANT CHANGE UP (ref 70–99)

## 2022-09-02 ENCOUNTER — OUTPATIENT (OUTPATIENT)
Dept: OUTPATIENT SERVICES | Facility: HOSPITAL | Age: 72
LOS: 1 days | Discharge: HOME | End: 2022-09-02

## 2022-09-02 DIAGNOSIS — I50.32 CHRONIC DIASTOLIC (CONGESTIVE) HEART FAILURE: ICD-10-CM

## 2022-09-02 DIAGNOSIS — Z98.890 OTHER SPECIFIED POSTPROCEDURAL STATES: Chronic | ICD-10-CM

## 2022-09-02 DIAGNOSIS — M21.611 BUNION OF RIGHT FOOT: Chronic | ICD-10-CM

## 2022-09-12 ENCOUNTER — APPOINTMENT (OUTPATIENT)
Dept: GASTROENTEROLOGY | Facility: CLINIC | Age: 72
End: 2022-09-12

## 2022-09-14 ENCOUNTER — OUTPATIENT (OUTPATIENT)
Dept: OUTPATIENT SERVICES | Facility: HOSPITAL | Age: 72
LOS: 1 days | Discharge: HOME | End: 2022-09-14

## 2022-09-14 DIAGNOSIS — Z98.890 OTHER SPECIFIED POSTPROCEDURAL STATES: Chronic | ICD-10-CM

## 2022-09-14 DIAGNOSIS — Z12.31 ENCOUNTER FOR SCREENING MAMMOGRAM FOR MALIGNANT NEOPLASM OF BREAST: ICD-10-CM

## 2022-09-14 DIAGNOSIS — M21.611 BUNION OF RIGHT FOOT: Chronic | ICD-10-CM

## 2022-09-14 PROCEDURE — 77067 SCR MAMMO BI INCL CAD: CPT | Mod: 26

## 2022-09-14 PROCEDURE — 77063 BREAST TOMOSYNTHESIS BI: CPT | Mod: 26

## 2022-10-05 ENCOUNTER — OUTPATIENT (OUTPATIENT)
Dept: OUTPATIENT SERVICES | Facility: HOSPITAL | Age: 72
LOS: 1 days | Discharge: HOME | End: 2022-10-05

## 2022-10-05 ENCOUNTER — RESULT REVIEW (OUTPATIENT)
Age: 72
End: 2022-10-05

## 2022-10-05 DIAGNOSIS — I44.2 ATRIOVENTRICULAR BLOCK, COMPLETE: ICD-10-CM

## 2022-10-05 DIAGNOSIS — M21.611 BUNION OF RIGHT FOOT: Chronic | ICD-10-CM

## 2022-10-05 DIAGNOSIS — D86.85 SARCOID MYOCARDITIS: ICD-10-CM

## 2022-10-05 DIAGNOSIS — Z98.890 OTHER SPECIFIED POSTPROCEDURAL STATES: Chronic | ICD-10-CM

## 2022-10-05 PROCEDURE — 78451 HT MUSCLE IMAGE SPECT SING: CPT | Mod: 26

## 2022-10-10 ENCOUNTER — OUTPATIENT (OUTPATIENT)
Dept: OUTPATIENT SERVICES | Facility: HOSPITAL | Age: 72
LOS: 1 days | Discharge: HOME | End: 2022-10-10

## 2022-10-10 ENCOUNTER — RESULT REVIEW (OUTPATIENT)
Age: 72
End: 2022-10-10

## 2022-10-10 ENCOUNTER — APPOINTMENT (OUTPATIENT)
Dept: GASTROENTEROLOGY | Facility: CLINIC | Age: 72
End: 2022-10-10

## 2022-10-10 DIAGNOSIS — M21.611 BUNION OF RIGHT FOOT: Chronic | ICD-10-CM

## 2022-10-10 DIAGNOSIS — Z98.890 OTHER SPECIFIED POSTPROCEDURAL STATES: Chronic | ICD-10-CM

## 2022-10-10 DIAGNOSIS — I44.2 ATRIOVENTRICULAR BLOCK, COMPLETE: ICD-10-CM

## 2022-10-10 LAB — GLUCOSE BLDC GLUCOMTR-MCNC: 109 MG/DL — HIGH (ref 70–99)

## 2022-10-10 PROCEDURE — 99443: CPT

## 2022-10-10 PROCEDURE — 78815 PET IMAGE W/CT SKULL-THIGH: CPT | Mod: 26

## 2022-10-10 NOTE — ASSESSMENT
[FreeTextEntry1] : Discussed EUS findings with the patient. No significant PD dilation on EUS although in some views there may have been a dilation to up to 5mm possibly secondary to shadowing rather than actual dilation. Will schedule repeat EUS in 6 nmomths

## 2022-10-10 NOTE — HISTORY OF PRESENT ILLNESS
[Home] : at home, [unfilled] , at the time of the visit. [Medical Office: ([facility])___] : at the medical office located in  [Verbal consent obtained from patient] : the patient, [unfilled] [FreeTextEntry4] : Dalila [de-identified] : 08/25/22 [FreeTextEntry1] : 08/25/22 [de-identified] : Patient reports having bloody diarrhea for several months and had colonoscopy done which showed colitis and patient reports pathology showed ulcerative colitis.  She was noted to have bradycardia post procedure and went on to have a cardiac evaluation. Cardiac MRI suggestive of sarcoidosis and showed 3 cm liver lesion. Patient had AICD placed.  No RUQ pain. Has been having mid abdominal pain intermittently for years which is better with bowel movement. No hx of jaundice.  No hx of birth control pills or hormone therapy.  Patient denies fever or chills.  Wt stable.
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
## 2023-04-08 ENCOUNTER — LABORATORY RESULT (OUTPATIENT)
Age: 73
End: 2023-04-08

## 2023-04-11 ENCOUNTER — TRANSCRIPTION ENCOUNTER (OUTPATIENT)
Age: 73
End: 2023-04-11

## 2023-04-11 ENCOUNTER — RESULT REVIEW (OUTPATIENT)
Age: 73
End: 2023-04-11

## 2023-04-11 ENCOUNTER — OUTPATIENT (OUTPATIENT)
Dept: INPATIENT UNIT | Facility: HOSPITAL | Age: 73
LOS: 1 days | Discharge: HOME | End: 2023-04-11
Payer: COMMERCIAL

## 2023-04-11 VITALS
HEART RATE: 83 BPM | SYSTOLIC BLOOD PRESSURE: 142 MMHG | DIASTOLIC BLOOD PRESSURE: 82 MMHG | RESPIRATION RATE: 22 BRPM | OXYGEN SATURATION: 94 %

## 2023-04-11 VITALS
HEART RATE: 88 BPM | WEIGHT: 128.09 LBS | HEIGHT: 61 IN | RESPIRATION RATE: 18 BRPM | DIASTOLIC BLOOD PRESSURE: 98 MMHG | SYSTOLIC BLOOD PRESSURE: 164 MMHG | TEMPERATURE: 98 F

## 2023-04-11 DIAGNOSIS — Z98.890 OTHER SPECIFIED POSTPROCEDURAL STATES: Chronic | ICD-10-CM

## 2023-04-11 DIAGNOSIS — Z95.810 PRESENCE OF AUTOMATIC (IMPLANTABLE) CARDIAC DEFIBRILLATOR: Chronic | ICD-10-CM

## 2023-04-11 DIAGNOSIS — M21.611 BUNION OF RIGHT FOOT: Chronic | ICD-10-CM

## 2023-04-11 DIAGNOSIS — E11.9 TYPE 2 DIABETES MELLITUS WITHOUT COMPLICATIONS: Chronic | ICD-10-CM

## 2023-04-11 DIAGNOSIS — K86.89 OTHER SPECIFIED DISEASES OF PANCREAS: ICD-10-CM

## 2023-04-11 PROCEDURE — 88312 SPECIAL STAINS GROUP 1: CPT

## 2023-04-11 PROCEDURE — 43237 ENDOSCOPIC US EXAM ESOPH: CPT

## 2023-04-11 PROCEDURE — 88312 SPECIAL STAINS GROUP 1: CPT | Mod: 26

## 2023-04-11 PROCEDURE — 43239 EGD BIOPSY SINGLE/MULTIPLE: CPT | Mod: XU

## 2023-04-11 PROCEDURE — 88305 TISSUE EXAM BY PATHOLOGIST: CPT

## 2023-04-11 PROCEDURE — 88305 TISSUE EXAM BY PATHOLOGIST: CPT | Mod: 26

## 2023-04-11 RX ORDER — METFORMIN HYDROCHLORIDE 850 MG/1
1 TABLET ORAL
Refills: 0 | DISCHARGE

## 2023-04-11 RX ORDER — AMLODIPINE BESYLATE 2.5 MG/1
1 TABLET ORAL
Qty: 0 | Refills: 0 | DISCHARGE

## 2023-04-11 RX ORDER — METFORMIN HYDROCHLORIDE 850 MG/1
0 TABLET ORAL
Qty: 0 | Refills: 0 | DISCHARGE

## 2023-04-11 RX ORDER — MESALAMINE 400 MG
2 TABLET, DELAYED RELEASE (ENTERIC COATED) ORAL
Qty: 0 | Refills: 0 | DISCHARGE

## 2023-04-11 RX ORDER — ROSUVASTATIN CALCIUM 5 MG/1
0 TABLET ORAL
Qty: 0 | Refills: 0 | DISCHARGE

## 2023-04-11 NOTE — ASU DISCHARGE PLAN (ADULT/PEDIATRIC) - CARE PROVIDER_API CALL
Itz [place])  Gastroenterology; Internal Medicine  [address] [place] [address]  Phone: [phone]  Fax: [phone]  Established Patient  Follow Up Time: Routine

## 2023-04-11 NOTE — ASU PATIENT PROFILE, ADULT - FALL HARM RISK - UNIVERSAL INTERVENTIONS
Bed in lowest position, wheels locked, appropriate side rails in place/Call bell, personal items and telephone in reach/Instruct patient to call for assistance before getting out of bed or chair/Non-slip footwear when patient is out of bed/[place] to call system/Physically safe environment - no spills, clutter or unnecessary equipment/Purposeful Proactive Rounding/Room/bathroom lighting operational, light cord in reach

## 2023-04-11 NOTE — ASU PATIENT PROFILE, ADULT - NSICDXPASTMEDICALHX_GEN_ALL_CORE_FT
PAST MEDICAL HISTORY:  Family history of medical problems cad, lbbb, hyperchoesteremia    H/O ulcerative colitis     Hypertension     Mitral insufficiency

## 2023-04-11 NOTE — ASU DISCHARGE PLAN (ADULT/PEDIATRIC) - NS MD DC FALL RISK RISK
For information on Fall & Injury Prevention, visit: [email or web address] OR  [email or web address] OR  https://www.cdc.gov/steadi/patient.html

## 2023-04-11 NOTE — ASU PATIENT PROFILE, ADULT - NSICDXPASTSURGICALHX_GEN_ALL_CORE_FT
PAST SURGICAL HISTORY:  Bilateral bunions      delivery delivered     DM (diabetes mellitus)     History of automatic internal cardiac defibrillator (AICD)     History of surgery tonsillectomy, hysterectomy, b/l eye surgery for muscle, knee miniscus sx right

## 2023-04-14 LAB — SURGICAL PATHOLOGY STUDY: SIGNIFICANT CHANGE UP

## 2023-04-17 ENCOUNTER — NON-APPOINTMENT (OUTPATIENT)
Age: 73
End: 2023-04-17

## 2023-04-17 ENCOUNTER — APPOINTMENT (OUTPATIENT)
Dept: CARDIOLOGY | Facility: CLINIC | Age: 73
End: 2023-04-17
Payer: COMMERCIAL

## 2023-04-17 PROCEDURE — 93295 DEV INTERROG REMOTE 1/2/MLT: CPT

## 2023-04-17 PROCEDURE — 93296 REM INTERROG EVL PM/IDS: CPT

## 2023-04-18 DIAGNOSIS — I34.0 NONRHEUMATIC MITRAL (VALVE) INSUFFICIENCY: ICD-10-CM

## 2023-04-18 DIAGNOSIS — E11.9 TYPE 2 DIABETES MELLITUS WITHOUT COMPLICATIONS: ICD-10-CM

## 2023-04-18 DIAGNOSIS — Z90.710 ACQUIRED ABSENCE OF BOTH CERVIX AND UTERUS: ICD-10-CM

## 2023-04-18 DIAGNOSIS — Z79.84 LONG TERM (CURRENT) USE OF ORAL HYPOGLYCEMIC DRUGS: ICD-10-CM

## 2023-04-18 DIAGNOSIS — K86.89 OTHER SPECIFIED DISEASES OF PANCREAS: ICD-10-CM

## 2023-04-18 DIAGNOSIS — K25.9 GASTRIC ULCER, UNSPECIFIED AS ACUTE OR CHRONIC, WITHOUT HEMORRHAGE OR PERFORATION: ICD-10-CM

## 2023-04-18 DIAGNOSIS — K20.0 EOSINOPHILIC ESOPHAGITIS: ICD-10-CM

## 2023-04-18 DIAGNOSIS — I10 ESSENTIAL (PRIMARY) HYPERTENSION: ICD-10-CM

## 2023-04-18 DIAGNOSIS — Z95.810 PRESENCE OF AUTOMATIC (IMPLANTABLE) CARDIAC DEFIBRILLATOR: ICD-10-CM

## 2023-04-18 DIAGNOSIS — D86.9 SARCOIDOSIS, UNSPECIFIED: ICD-10-CM

## 2023-04-18 DIAGNOSIS — K29.50 UNSPECIFIED CHRONIC GASTRITIS WITHOUT BLEEDING: ICD-10-CM

## 2023-04-24 ENCOUNTER — APPOINTMENT (OUTPATIENT)
Dept: GASTROENTEROLOGY | Facility: CLINIC | Age: 73
End: 2023-04-24
Payer: COMMERCIAL

## 2023-04-24 ENCOUNTER — APPOINTMENT (OUTPATIENT)
Dept: GASTROENTEROLOGY | Facility: CLINIC | Age: 73
End: 2023-04-24

## 2023-04-24 DIAGNOSIS — K86.89 OTHER SPECIFIED DISEASES OF PANCREAS: ICD-10-CM

## 2023-04-24 PROCEDURE — 99443: CPT

## 2023-04-24 NOTE — HISTORY OF PRESENT ILLNESS
[FreeTextEntry1] : 73-year-old female following up after an EUS to investigate abnormal CT revealing.  Patient had an EUS in August 2022 which revealed mild dilation in the PD.  Repeat EUS for surveillance reveals normal-sized PD normal-sized CBD without any visible stones or masses.  Patient has no complaints.  She questioned the indication for the EUS and was under the impression that it was done to evaluate for abnormal liver enzymes.  She has had mild elevation in the total bilirubin on 2 different occasions in the past as per the records on file.  Her bilirubin was 1.4 on 1 occasion 1.3 on another occasion.  She was referred from hepatology was evaluating this mild abnormality and suggested that this could be secondary to Gilbert's syndrome.\par  [de-identified] : 04/11/23

## 2023-04-24 NOTE — REASON FOR VISIT
[Consultation] : a consultation visit [Home] : at home, [unfilled] , at the time of the visit. [Medical Office: ([facility])___] : at the medical office located in  [Verbal consent obtained from patient] : the patient, [unfilled] [FreeTextEntry1] : F/U -Post EGD

## 2023-04-24 NOTE — ASSESSMENT
[FreeTextEntry1] : 73-year-old female status post AICD following up for an EUS\par \par Plan\par Suggested an MRI as a baseline study for the future although there is no concern for a mass on EUS or CAT scan in the past and there is no significant PD dilation or CBD dilation\par Patient to follow-up with her EP and get back to us to confirm compatibility for the MRI\par We will order MRI at that point

## 2023-07-18 ENCOUNTER — NON-APPOINTMENT (OUTPATIENT)
Age: 73
End: 2023-07-18

## 2023-07-18 ENCOUNTER — APPOINTMENT (OUTPATIENT)
Dept: CARDIOLOGY | Facility: CLINIC | Age: 73
End: 2023-07-18
Payer: COMMERCIAL

## 2023-07-18 PROCEDURE — 93296 REM INTERROG EVL PM/IDS: CPT

## 2023-07-18 PROCEDURE — 93295 DEV INTERROG REMOTE 1/2/MLT: CPT

## 2023-09-05 NOTE — ASU DISCHARGE PLAN (ADULT/PEDIATRIC) - CALL YOUR DOCTOR IF YOU HAVE ANY OF THE FOLLOWING:
93 Bleeding that does not stop/Pain not relieved by Medications/Fever greater than (need to indicate Fahrenheit or Celsius)/Numbness, tingling, color or temperature change to extremity/Excessive diarrhea/Inability to tolerate liquids or foods

## 2023-09-13 ENCOUNTER — APPOINTMENT (OUTPATIENT)
Dept: OTOLARYNGOLOGY | Facility: CLINIC | Age: 73
End: 2023-09-13

## 2023-09-16 ENCOUNTER — OUTPATIENT (OUTPATIENT)
Dept: OUTPATIENT SERVICES | Facility: HOSPITAL | Age: 73
LOS: 1 days | End: 2023-09-16
Payer: COMMERCIAL

## 2023-09-16 DIAGNOSIS — Z12.31 ENCOUNTER FOR SCREENING MAMMOGRAM FOR MALIGNANT NEOPLASM OF BREAST: ICD-10-CM

## 2023-09-16 DIAGNOSIS — Z98.890 OTHER SPECIFIED POSTPROCEDURAL STATES: Chronic | ICD-10-CM

## 2023-09-16 DIAGNOSIS — M21.611 BUNION OF RIGHT FOOT: Chronic | ICD-10-CM

## 2023-09-16 DIAGNOSIS — E11.9 TYPE 2 DIABETES MELLITUS WITHOUT COMPLICATIONS: Chronic | ICD-10-CM

## 2023-09-16 DIAGNOSIS — Z95.810 PRESENCE OF AUTOMATIC (IMPLANTABLE) CARDIAC DEFIBRILLATOR: Chronic | ICD-10-CM

## 2023-09-16 PROCEDURE — 77063 BREAST TOMOSYNTHESIS BI: CPT | Mod: 26

## 2023-09-16 PROCEDURE — 77067 SCR MAMMO BI INCL CAD: CPT

## 2023-09-16 PROCEDURE — 77067 SCR MAMMO BI INCL CAD: CPT | Mod: 26

## 2023-09-16 PROCEDURE — 77063 BREAST TOMOSYNTHESIS BI: CPT

## 2023-09-17 DIAGNOSIS — Z12.31 ENCOUNTER FOR SCREENING MAMMOGRAM FOR MALIGNANT NEOPLASM OF BREAST: ICD-10-CM

## 2023-09-28 ENCOUNTER — OUTPATIENT (OUTPATIENT)
Dept: OUTPATIENT SERVICES | Facility: HOSPITAL | Age: 73
LOS: 1 days | End: 2023-09-28
Payer: COMMERCIAL

## 2023-09-28 DIAGNOSIS — Z98.890 OTHER SPECIFIED POSTPROCEDURAL STATES: Chronic | ICD-10-CM

## 2023-09-28 DIAGNOSIS — R92.8 OTHER ABNORMAL AND INCONCLUSIVE FINDINGS ON DIAGNOSTIC IMAGING OF BREAST: ICD-10-CM

## 2023-09-28 DIAGNOSIS — M21.611 BUNION OF RIGHT FOOT: Chronic | ICD-10-CM

## 2023-09-28 DIAGNOSIS — Z95.810 PRESENCE OF AUTOMATIC (IMPLANTABLE) CARDIAC DEFIBRILLATOR: Chronic | ICD-10-CM

## 2023-09-28 DIAGNOSIS — E11.9 TYPE 2 DIABETES MELLITUS WITHOUT COMPLICATIONS: Chronic | ICD-10-CM

## 2023-09-28 PROCEDURE — 77065 DX MAMMO INCL CAD UNI: CPT | Mod: LT

## 2023-09-28 PROCEDURE — G0279: CPT | Mod: 26

## 2023-09-28 PROCEDURE — G0279: CPT

## 2023-09-28 PROCEDURE — 76642 ULTRASOUND BREAST LIMITED: CPT | Mod: 26,LT

## 2023-09-28 PROCEDURE — 77065 DX MAMMO INCL CAD UNI: CPT | Mod: 26,LT

## 2023-09-28 PROCEDURE — 76642 ULTRASOUND BREAST LIMITED: CPT | Mod: LT

## 2023-09-29 DIAGNOSIS — R92.8 OTHER ABNORMAL AND INCONCLUSIVE FINDINGS ON DIAGNOSTIC IMAGING OF BREAST: ICD-10-CM

## 2023-10-17 ENCOUNTER — APPOINTMENT (OUTPATIENT)
Dept: CARDIOLOGY | Facility: CLINIC | Age: 73
End: 2023-10-17
Payer: COMMERCIAL

## 2023-10-17 ENCOUNTER — NON-APPOINTMENT (OUTPATIENT)
Age: 73
End: 2023-10-17

## 2023-10-17 PROCEDURE — 93295 DEV INTERROG REMOTE 1/2/MLT: CPT

## 2023-10-17 PROCEDURE — 93296 REM INTERROG EVL PM/IDS: CPT

## 2023-10-31 NOTE — PRE-ANESTHESIA EVALUATION ADULT - NSANTHASARD_GEN_ALL_CORE
0948 Patient arrived to PACU. Report received from anesthesia and OR RN. Patient on 4L of oxygen via mask. Placed on monitor. Patients surgical site to left groin CDI with sheath in place. Patient sleeping.     1040 Sheath removed by charge RN, occlusive dressing placed. Sand bag in place, site CDI    1100 Patients daughter Yamilka updated on recovery. Patient tolerating sips of water.     1240 Sandbag removed, site CDI.     1440 Bed rest completed.     1450 Patient up to bathroom to void and dressed at bedside.     1515 Patient discharged with daughter. Left groin site CDI. Discharge education provided to patient and daughter and questions answered. Educated on medications sent to pharmacy. PIV removed. All belongings gathered and sent with patient.    
3

## 2023-12-08 NOTE — H&P ADULT - NSHPLABSRESULTS_GEN_ALL_CORE
VITAL SIGNS: Last 24 Hours  T(C): 36.8 (29 Apr 2022 18:13), Max: 36.8 (29 Apr 2022 18:13)  T(F): 98.3 (29 Apr 2022 18:13), Max: 98.3 (29 Apr 2022 18:13)  HR: 55 (29 Apr 2022 18:51) (45 - 55)  BP: 133/69 (29 Apr 2022 18:51) (133/69 - 198/83)  BP(mean): --  RR: 18 (29 Apr 2022 18:13) (18 - 18)  SpO2: 97% (29 Apr 2022 18:13) (97% - 97%)    LABS:                        13.4   8.12  )-----------( 275      ( 29 Apr 2022 18:54 )             39.4     04-29    145  |  108  |  8<L>  ----------------------------<  148<H>  3.9   |  22  |  0.6<L>    Ca    9.5      29 Apr 2022 18:54  Mg     1.6     04-29    TPro  7.2  /  Alb  4.6  /  TBili  0.7  /  DBili  x   /  AST  27  /  ALT  39  /  AlkPhos  53  04-29    Troponin T, Serum: <0.01 ng/mL (04-29-22 @ 18:54)    CARDIAC MARKERS ( 29 Apr 2022 18:54 )  x     / <0.01 ng/mL / x     / x     / x
Home

## 2024-01-16 ENCOUNTER — NON-APPOINTMENT (OUTPATIENT)
Age: 74
End: 2024-01-16

## 2024-01-16 ENCOUNTER — APPOINTMENT (OUTPATIENT)
Dept: CARDIOLOGY | Facility: CLINIC | Age: 74
End: 2024-01-16
Payer: COMMERCIAL

## 2024-01-16 PROCEDURE — 93296 REM INTERROG EVL PM/IDS: CPT

## 2024-01-16 PROCEDURE — 93295 DEV INTERROG REMOTE 1/2/MLT: CPT

## 2024-04-22 ENCOUNTER — NON-APPOINTMENT (OUTPATIENT)
Age: 74
End: 2024-04-22

## 2024-04-23 ENCOUNTER — APPOINTMENT (OUTPATIENT)
Dept: CARDIOLOGY | Facility: CLINIC | Age: 74
End: 2024-04-23
Payer: COMMERCIAL

## 2024-04-23 PROCEDURE — 93296 REM INTERROG EVL PM/IDS: CPT

## 2024-04-23 PROCEDURE — 93295 DEV INTERROG REMOTE 1/2/MLT: CPT

## 2024-07-22 ENCOUNTER — NON-APPOINTMENT (OUTPATIENT)
Age: 74
End: 2024-07-22

## 2024-07-23 ENCOUNTER — APPOINTMENT (OUTPATIENT)
Dept: CARDIOLOGY | Facility: CLINIC | Age: 74
End: 2024-07-23
Payer: COMMERCIAL

## 2024-07-23 PROCEDURE — 93295 DEV INTERROG REMOTE 1/2/MLT: CPT

## 2024-07-23 PROCEDURE — 93296 REM INTERROG EVL PM/IDS: CPT

## 2024-09-21 NOTE — PROGRESS NOTE ADULT - SUBJECTIVE AND OBJECTIVE BOX
INTERVAL HPI/OVERNIGHT EVENTS: No acute events overnight    MEDICATIONS  (STANDING):  amLODIPine   Tablet 5 milliGRAM(s) Oral daily  atorvastatin 40 milliGRAM(s) Oral at bedtime  chlorhexidine 4% Liquid 1 Application(s) Topical <User Schedule>  enalapril 20 milliGRAM(s) Oral two times a day  insulin lispro (ADMELOG) corrective regimen sliding scale   SubCutaneous three times a day before meals  insulin lispro (ADMELOG) corrective regimen sliding scale   SubCutaneous at bedtime  lactated ringers. 1000 milliLiter(s) (75 mL/Hr) IV Continuous <Continuous>  magnesium sulfate  IVPB 2 Gram(s) IV Intermittent once  mesalamine DR Capsule 1200 milliGRAM(s) Oral every 8 hours  vancomycin  IVPB 750 milliGRAM(s) IV Intermittent every 12 hours    MEDICATIONS  (PRN):  acetaminophen     Tablet .. 650 milliGRAM(s) Oral every 6 hours PRN Moderate Pain (4 - 6)  morphine  - Injectable 2 milliGRAM(s) IV Push every 10 minutes PRN Severe Pain (7 - 10)  ondansetron Injectable 4 milliGRAM(s) IV Push once PRN Nausea and/or Vomiting      Allergies  No Known Allergies    Intolerances        REVIEW OF SYSTEMS: No CP, palpitations, dizziness or SOB    Vital Signs Last 24 Hrs  T(C): 36.9 (04 May 2022 08:00), Max: 36.9 (04 May 2022 04:49)  T(F): 98.5 (04 May 2022 08:00), Max: 98.5 (04 May 2022 08:00)  HR: 79 (04 May 2022 08:00) (42 - 97)  BP: 158/73 (04 May 2022 08:00) (119/66 - 180/76)  BP(mean): 95 (04 May 2022 04:49) (84 - 109)  RR: 20 (04 May 2022 08:00) (20 - 41)  SpO2: 96% (04 May 2022 05:43) (96% - 96%)    05-03-22 @ 07:01  -  05-04-22 @ 07:00  --------------------------------------------------------  IN: 750 mL / OUT: 0 mL / NET: 750 mL        Physical Exam  GENERAL: In no apparent distress, well nourished, and hydrated.  EYES: EOMI, PERRLA, conjunctiva and sclera clear  NECK: Supple  HEART: Regular rate and rhythm; No murmurs, rubs, or gallops.  PULMONARY: Clear to auscultation and perfusion.  No rales, wheezing, or rhonchi bilaterally.  EXTREMITIES:  2+ Peripheral Pulses, No LE edema BL  SKIN: Dressing over L chest wall, no hematoma  NEUROLOGICAL: A&Ox3; no focal deficits    LABS:                        13.3   7.68  )-----------( 245      ( 03 May 2022 05:00 )             39.6     05-04    140  |  103  |  11  ----------------------------<  141<H>  4.3   |  23  |  0.5<L>    Ca    9.0      04 May 2022 06:23  Mg     1.7     05-04    TPro  6.4  /  Alb  4.1  /  TBili  1.3<H>  /  DBili  x   /  AST  11  /  ALT  14  /  AlkPhos  58  05-04    PT/INR - ( 02 May 2022 11:00 )   PT: 12.30 sec;   INR: 1.07 ratio               RADIOLOGY & ADDITIONAL TESTS:   range of motion is not limited, no muscle or joint tenderness

## 2024-10-11 ENCOUNTER — EMERGENCY (EMERGENCY)
Facility: HOSPITAL | Age: 74
LOS: 0 days | Discharge: HOME | End: 2024-10-12
Attending: EMERGENCY MEDICINE
Payer: COMMERCIAL

## 2024-10-11 VITALS
OXYGEN SATURATION: 96 % | DIASTOLIC BLOOD PRESSURE: 59 MMHG | TEMPERATURE: 98 F | RESPIRATION RATE: 18 BRPM | WEIGHT: 115.08 LBS | HEART RATE: 80 BPM | HEIGHT: 61 IN | SYSTOLIC BLOOD PRESSURE: 106 MMHG

## 2024-10-11 DIAGNOSIS — E11.9 TYPE 2 DIABETES MELLITUS WITHOUT COMPLICATIONS: Chronic | ICD-10-CM

## 2024-10-11 DIAGNOSIS — I10 ESSENTIAL (PRIMARY) HYPERTENSION: ICD-10-CM

## 2024-10-11 DIAGNOSIS — R55 SYNCOPE AND COLLAPSE: ICD-10-CM

## 2024-10-11 DIAGNOSIS — I47.10 SUPRAVENTRICULAR TACHYCARDIA, UNSPECIFIED: ICD-10-CM

## 2024-10-11 DIAGNOSIS — Z98.890 OTHER SPECIFIED POSTPROCEDURAL STATES: Chronic | ICD-10-CM

## 2024-10-11 DIAGNOSIS — I95.9 HYPOTENSION, UNSPECIFIED: ICD-10-CM

## 2024-10-11 DIAGNOSIS — I48.91 UNSPECIFIED ATRIAL FIBRILLATION: ICD-10-CM

## 2024-10-11 DIAGNOSIS — E78.5 HYPERLIPIDEMIA, UNSPECIFIED: ICD-10-CM

## 2024-10-11 DIAGNOSIS — Z95.810 PRESENCE OF AUTOMATIC (IMPLANTABLE) CARDIAC DEFIBRILLATOR: Chronic | ICD-10-CM

## 2024-10-11 DIAGNOSIS — E11.9 TYPE 2 DIABETES MELLITUS WITHOUT COMPLICATIONS: ICD-10-CM

## 2024-10-11 DIAGNOSIS — Z79.01 LONG TERM (CURRENT) USE OF ANTICOAGULANTS: ICD-10-CM

## 2024-10-11 DIAGNOSIS — I44.7 LEFT BUNDLE-BRANCH BLOCK, UNSPECIFIED: ICD-10-CM

## 2024-10-11 DIAGNOSIS — M21.611 BUNION OF RIGHT FOOT: Chronic | ICD-10-CM

## 2024-10-11 LAB
BASOPHILS # BLD AUTO: 0.04 K/UL — SIGNIFICANT CHANGE UP (ref 0–0.2)
BASOPHILS NFR BLD AUTO: 0.6 % — SIGNIFICANT CHANGE UP (ref 0–1)
EOSINOPHIL # BLD AUTO: 0.04 K/UL — SIGNIFICANT CHANGE UP (ref 0–0.7)
EOSINOPHIL NFR BLD AUTO: 0.6 % — SIGNIFICANT CHANGE UP (ref 0–8)
HCT VFR BLD CALC: 39.9 % — SIGNIFICANT CHANGE UP (ref 37–47)
HGB BLD-MCNC: 13 G/DL — SIGNIFICANT CHANGE UP (ref 12–16)
IMM GRANULOCYTES NFR BLD AUTO: 0.3 % — SIGNIFICANT CHANGE UP (ref 0.1–0.3)
LYMPHOCYTES # BLD AUTO: 1.35 K/UL — SIGNIFICANT CHANGE UP (ref 1.2–3.4)
LYMPHOCYTES # BLD AUTO: 19.1 % — LOW (ref 20.5–51.1)
MCHC RBC-ENTMCNC: 28.2 PG — SIGNIFICANT CHANGE UP (ref 27–31)
MCHC RBC-ENTMCNC: 32.6 G/DL — SIGNIFICANT CHANGE UP (ref 32–37)
MCV RBC AUTO: 86.6 FL — SIGNIFICANT CHANGE UP (ref 81–99)
MONOCYTES # BLD AUTO: 0.72 K/UL — HIGH (ref 0.1–0.6)
MONOCYTES NFR BLD AUTO: 10.2 % — HIGH (ref 1.7–9.3)
NEUTROPHILS # BLD AUTO: 4.91 K/UL — SIGNIFICANT CHANGE UP (ref 1.4–6.5)
NEUTROPHILS NFR BLD AUTO: 69.2 % — SIGNIFICANT CHANGE UP (ref 42.2–75.2)
NRBC # BLD: 0 /100 WBCS — SIGNIFICANT CHANGE UP (ref 0–0)
NRBC BLD-RTO: 0 /100 WBCS — SIGNIFICANT CHANGE UP (ref 0–0)
NT-PROBNP SERPL-SCNC: 134 PG/ML — SIGNIFICANT CHANGE UP (ref 0–300)
PLATELET # BLD AUTO: 262 K/UL — SIGNIFICANT CHANGE UP (ref 130–400)
PMV BLD: 12.1 FL — HIGH (ref 7.4–10.4)
RBC # BLD: 4.61 M/UL — SIGNIFICANT CHANGE UP (ref 4.2–5.4)
RBC # FLD: 14.8 % — HIGH (ref 11.5–14.5)
TROPONIN T, HIGH SENSITIVITY RESULT: 11 NG/L — SIGNIFICANT CHANGE UP (ref 6–13)
WBC # BLD: 7.08 K/UL — SIGNIFICANT CHANGE UP (ref 4.8–10.8)
WBC # FLD AUTO: 7.08 K/UL — SIGNIFICANT CHANGE UP (ref 4.8–10.8)

## 2024-10-11 PROCEDURE — 85025 COMPLETE CBC W/AUTO DIFF WBC: CPT

## 2024-10-11 PROCEDURE — 93306 TTE W/DOPPLER COMPLETE: CPT

## 2024-10-11 PROCEDURE — 80053 COMPREHEN METABOLIC PANEL: CPT

## 2024-10-11 PROCEDURE — 36415 COLL VENOUS BLD VENIPUNCTURE: CPT

## 2024-10-11 PROCEDURE — G0378: CPT

## 2024-10-11 PROCEDURE — 93010 ELECTROCARDIOGRAM REPORT: CPT

## 2024-10-11 PROCEDURE — 83880 ASSAY OF NATRIURETIC PEPTIDE: CPT

## 2024-10-11 PROCEDURE — 84484 ASSAY OF TROPONIN QUANT: CPT

## 2024-10-11 PROCEDURE — 71045 X-RAY EXAM CHEST 1 VIEW: CPT

## 2024-10-11 PROCEDURE — 99285 EMERGENCY DEPT VISIT HI MDM: CPT

## 2024-10-11 PROCEDURE — 93005 ELECTROCARDIOGRAM TRACING: CPT

## 2024-10-11 PROCEDURE — 99285 EMERGENCY DEPT VISIT HI MDM: CPT | Mod: 25

## 2024-10-12 ENCOUNTER — RESULT REVIEW (OUTPATIENT)
Age: 74
End: 2024-10-12

## 2024-10-12 VITALS
SYSTOLIC BLOOD PRESSURE: 123 MMHG | TEMPERATURE: 98 F | OXYGEN SATURATION: 96 % | HEART RATE: 78 BPM | RESPIRATION RATE: 18 BRPM | DIASTOLIC BLOOD PRESSURE: 65 MMHG

## 2024-10-12 LAB
ALBUMIN SERPL ELPH-MCNC: 4.2 G/DL — SIGNIFICANT CHANGE UP (ref 3.5–5.2)
ALP SERPL-CCNC: 47 U/L — SIGNIFICANT CHANGE UP (ref 30–115)
ALT FLD-CCNC: 11 U/L — SIGNIFICANT CHANGE UP (ref 0–41)
ANION GAP SERPL CALC-SCNC: 14 MMOL/L — SIGNIFICANT CHANGE UP (ref 7–14)
AST SERPL-CCNC: 18 U/L — SIGNIFICANT CHANGE UP (ref 0–41)
BILIRUB SERPL-MCNC: 0.4 MG/DL — SIGNIFICANT CHANGE UP (ref 0.2–1.2)
BUN SERPL-MCNC: 30 MG/DL — HIGH (ref 10–20)
CALCIUM SERPL-MCNC: 9.2 MG/DL — SIGNIFICANT CHANGE UP (ref 8.4–10.5)
CHLORIDE SERPL-SCNC: 103 MMOL/L — SIGNIFICANT CHANGE UP (ref 98–110)
CO2 SERPL-SCNC: 24 MMOL/L — SIGNIFICANT CHANGE UP (ref 17–32)
CREAT SERPL-MCNC: 0.9 MG/DL — SIGNIFICANT CHANGE UP (ref 0.7–1.5)
EGFR: 68 ML/MIN/1.73M2 — SIGNIFICANT CHANGE UP
EGFR: 68 ML/MIN/1.73M2 — SIGNIFICANT CHANGE UP
GLUCOSE SERPL-MCNC: 165 MG/DL — HIGH (ref 70–99)
POTASSIUM SERPL-MCNC: 4.4 MMOL/L — SIGNIFICANT CHANGE UP (ref 3.5–5)
POTASSIUM SERPL-SCNC: 4.4 MMOL/L — SIGNIFICANT CHANGE UP (ref 3.5–5)
PROT SERPL-MCNC: 6.6 G/DL — SIGNIFICANT CHANGE UP (ref 6–8)
SODIUM SERPL-SCNC: 141 MMOL/L — SIGNIFICANT CHANGE UP (ref 135–146)
TROPONIN T, HIGH SENSITIVITY RESULT: 11 NG/L — SIGNIFICANT CHANGE UP (ref 6–13)

## 2024-10-12 PROCEDURE — 99236 HOSP IP/OBS SAME DATE HI 85: CPT

## 2024-10-12 PROCEDURE — 93306 TTE W/DOPPLER COMPLETE: CPT | Mod: 26

## 2024-10-12 PROCEDURE — 71045 X-RAY EXAM CHEST 1 VIEW: CPT | Mod: 26

## 2024-10-17 ENCOUNTER — OUTPATIENT (OUTPATIENT)
Dept: OUTPATIENT SERVICES | Facility: HOSPITAL | Age: 74
LOS: 1 days | End: 2024-10-17
Payer: COMMERCIAL

## 2024-10-17 DIAGNOSIS — Z98.890 OTHER SPECIFIED POSTPROCEDURAL STATES: Chronic | ICD-10-CM

## 2024-10-17 DIAGNOSIS — Z00.00 ENCOUNTER FOR GENERAL ADULT MEDICAL EXAMINATION WITHOUT ABNORMAL FINDINGS: ICD-10-CM

## 2024-10-17 DIAGNOSIS — Z12.31 ENCOUNTER FOR SCREENING MAMMOGRAM FOR MALIGNANT NEOPLASM OF BREAST: ICD-10-CM

## 2024-10-17 DIAGNOSIS — M21.611 BUNION OF RIGHT FOOT: Chronic | ICD-10-CM

## 2024-10-17 DIAGNOSIS — E11.9 TYPE 2 DIABETES MELLITUS WITHOUT COMPLICATIONS: Chronic | ICD-10-CM

## 2024-10-17 DIAGNOSIS — Z95.810 PRESENCE OF AUTOMATIC (IMPLANTABLE) CARDIAC DEFIBRILLATOR: Chronic | ICD-10-CM

## 2024-10-17 PROCEDURE — 77063 BREAST TOMOSYNTHESIS BI: CPT | Mod: 26

## 2024-10-17 PROCEDURE — 77063 BREAST TOMOSYNTHESIS BI: CPT

## 2024-10-17 PROCEDURE — 77067 SCR MAMMO BI INCL CAD: CPT | Mod: 26

## 2024-10-17 PROCEDURE — 77067 SCR MAMMO BI INCL CAD: CPT

## 2024-10-18 DIAGNOSIS — Z12.31 ENCOUNTER FOR SCREENING MAMMOGRAM FOR MALIGNANT NEOPLASM OF BREAST: ICD-10-CM

## 2024-10-22 ENCOUNTER — APPOINTMENT (OUTPATIENT)
Dept: CARDIOLOGY | Facility: CLINIC | Age: 74
End: 2024-10-22
Payer: COMMERCIAL

## 2024-10-22 ENCOUNTER — NON-APPOINTMENT (OUTPATIENT)
Age: 74
End: 2024-10-22

## 2024-10-22 PROCEDURE — 93295 DEV INTERROG REMOTE 1/2/MLT: CPT

## 2024-10-22 PROCEDURE — 93296 REM INTERROG EVL PM/IDS: CPT

## 2025-01-21 ENCOUNTER — APPOINTMENT (OUTPATIENT)
Dept: CARDIOLOGY | Facility: CLINIC | Age: 75
End: 2025-01-21

## 2025-01-21 ENCOUNTER — NON-APPOINTMENT (OUTPATIENT)
Age: 75
End: 2025-01-21

## 2025-01-21 PROCEDURE — 93296 REM INTERROG EVL PM/IDS: CPT

## 2025-01-21 PROCEDURE — 93295 DEV INTERROG REMOTE 1/2/MLT: CPT

## 2025-04-22 ENCOUNTER — APPOINTMENT (OUTPATIENT)
Dept: CARDIOLOGY | Facility: CLINIC | Age: 75
End: 2025-04-22
Payer: MEDICARE

## 2025-04-22 ENCOUNTER — NON-APPOINTMENT (OUTPATIENT)
Age: 75
End: 2025-04-22

## 2025-04-22 PROCEDURE — 93296 REM INTERROG EVL PM/IDS: CPT

## 2025-04-22 PROCEDURE — 93295 DEV INTERROG REMOTE 1/2/MLT: CPT

## 2025-05-28 ENCOUNTER — OUTPATIENT (OUTPATIENT)
Dept: OUTPATIENT SERVICES | Facility: HOSPITAL | Age: 75
LOS: 1 days | End: 2025-05-28
Payer: MEDICARE

## 2025-05-28 DIAGNOSIS — Z95.810 PRESENCE OF AUTOMATIC (IMPLANTABLE) CARDIAC DEFIBRILLATOR: Chronic | ICD-10-CM

## 2025-05-28 DIAGNOSIS — Z98.890 OTHER SPECIFIED POSTPROCEDURAL STATES: Chronic | ICD-10-CM

## 2025-05-28 DIAGNOSIS — R07.9 CHEST PAIN, UNSPECIFIED: ICD-10-CM

## 2025-05-28 DIAGNOSIS — M21.611 BUNION OF RIGHT FOOT: Chronic | ICD-10-CM

## 2025-05-28 DIAGNOSIS — E11.9 TYPE 2 DIABETES MELLITUS WITHOUT COMPLICATIONS: Chronic | ICD-10-CM

## 2025-05-28 DIAGNOSIS — Z00.8 ENCOUNTER FOR OTHER GENERAL EXAMINATION: ICD-10-CM

## 2025-05-28 PROCEDURE — 75574 CT ANGIO HRT W/3D IMAGE: CPT

## 2025-05-28 PROCEDURE — 75574 CT ANGIO HRT W/3D IMAGE: CPT | Mod: 26

## 2025-05-29 DIAGNOSIS — R07.9 CHEST PAIN, UNSPECIFIED: ICD-10-CM

## 2025-07-22 ENCOUNTER — NON-APPOINTMENT (OUTPATIENT)
Age: 75
End: 2025-07-22

## 2025-07-22 ENCOUNTER — APPOINTMENT (OUTPATIENT)
Dept: CARDIOLOGY | Facility: CLINIC | Age: 75
End: 2025-07-22
Payer: MEDICARE

## 2025-07-22 PROCEDURE — 93295 DEV INTERROG REMOTE 1/2/MLT: CPT

## 2025-07-22 PROCEDURE — 93296 REM INTERROG EVL PM/IDS: CPT
